# Patient Record
Sex: FEMALE | Race: WHITE | Employment: OTHER | URBAN - METROPOLITAN AREA
[De-identification: names, ages, dates, MRNs, and addresses within clinical notes are randomized per-mention and may not be internally consistent; named-entity substitution may affect disease eponyms.]

---

## 2018-10-02 ENCOUNTER — HOSPITAL ENCOUNTER (OUTPATIENT)
Age: 68
Discharge: HOME | End: 2018-10-04
Payer: MEDICARE

## 2018-10-02 PROCEDURE — 87088 URINE BACTERIA CULTURE: CPT

## 2018-10-02 PROCEDURE — 87186 SC STD MICRODIL/AGAR DIL: CPT

## 2018-10-02 PROCEDURE — 87077 CULTURE AEROBIC IDENTIFY: CPT

## 2018-10-05 LAB
ORGANISM: ABNORMAL
ORGANISM: ABNORMAL
URINE CULTURE, ROUTINE: ABNORMAL

## 2019-05-03 ENCOUNTER — APPOINTMENT (OUTPATIENT)
Dept: CT IMAGING | Age: 69
End: 2019-05-03
Payer: MEDICARE

## 2019-05-03 ENCOUNTER — HOSPITAL ENCOUNTER (EMERGENCY)
Age: 69
Discharge: HOME | End: 2019-05-03
Attending: EMERGENCY MEDICINE
Payer: MEDICARE

## 2019-05-03 VITALS
SYSTOLIC BLOOD PRESSURE: 142 MMHG | TEMPERATURE: 98.1 F | HEART RATE: 76 BPM | OXYGEN SATURATION: 96 % | RESPIRATION RATE: 14 BRPM | DIASTOLIC BLOOD PRESSURE: 85 MMHG
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
## 2019-05-03 DIAGNOSIS — S00.03XA HEMATOMA OF FRONTAL SCALP, INITIAL ENCOUNTER: ICD-10-CM

## 2019-05-03 DIAGNOSIS — S01.81XA FACIAL LACERATION, INITIAL ENCOUNTER: Primary | ICD-10-CM

## 2019-05-03 PROCEDURE — 70486 CT MAXILLOFACIAL W/O DYE: CPT

## 2019-05-03 PROCEDURE — 70450 CT HEAD/BRAIN W/O DYE: CPT

## 2019-05-03 PROCEDURE — 2500000003 HC RX 250 WO HCPCS: Performed by: STUDENT IN AN ORGANIZED HEALTH CARE EDUCATION/TRAINING PROGRAM

## 2019-05-03 PROCEDURE — 99283 EMERGENCY DEPT VISIT LOW MDM: CPT

## 2019-05-03 PROCEDURE — 12011 RPR F/E/E/N/L/M 2.5 CM/<: CPT

## 2019-05-03 PROCEDURE — 90715 TDAP VACCINE 7 YRS/> IM: CPT | Performed by: EMERGENCY MEDICINE

## 2019-05-03 PROCEDURE — 90471 IMMUNIZATION ADMIN: CPT | Performed by: EMERGENCY MEDICINE

## 2019-05-03 PROCEDURE — 6360000002 HC RX W HCPCS: Performed by: EMERGENCY MEDICINE

## 2019-05-03 RX ORDER — LIDOCAINE HYDROCHLORIDE AND EPINEPHRINE 10; 10 MG/ML; UG/ML
20 INJECTION, SOLUTION INFILTRATION; PERINEURAL ONCE
Status: COMPLETED | OUTPATIENT
Start: 2019-05-03 | End: 2019-05-03

## 2019-05-03 RX ADMIN — LIDOCAINE HYDROCHLORIDE AND EPINEPHRINE 20 ML: 10; 10 INJECTION, SOLUTION INFILTRATION; PERINEURAL at 12:03

## 2019-05-03 RX ADMIN — TETANUS TOXOID, REDUCED DIPHTHERIA TOXOID AND ACELLULAR PERTUSSIS VACCINE, ADSORBED 0.5 ML: 5; 2.5; 8; 8; 2.5 SUSPENSION INTRAMUSCULAR at 12:03

## 2019-05-03 ASSESSMENT — ENCOUNTER SYMPTOMS
ABDOMINAL DISTENTION: 0
TROUBLE SWALLOWING: 0
ABDOMINAL PAIN: 0
SHORTNESS OF BREATH: 0
VOMITING: 0
CONSTIPATION: 0
BLOOD IN STOOL: 0
PHOTOPHOBIA: 0
FACIAL SWELLING: 1
BACK PAIN: 0
WHEEZING: 0
CHEST TIGHTNESS: 0
SINUS PRESSURE: 0
EYE REDNESS: 0
SORE THROAT: 0
EYE PAIN: 0
DIARRHEA: 0
RHINORRHEA: 0
NAUSEA: 0
COUGH: 0

## 2019-05-03 ASSESSMENT — PAIN SCALES - GENERAL: PAINLEVEL_OUTOF10: 0

## 2019-05-03 NOTE — ED PROVIDER NOTES
Patient is a 59-year-old female who presented to ED for evaluation of fall and head laceration. Onset of symptoms was earlier this a.m. Patient notes that she had stepped off of her driveway and fell, landing onto sidewalk. Patient with laceration to the bridge of the nose as well as hematoma to the forehead. Patient denies loss of consciousness. Patient denies being on anticoagulation. Patient denies neck pain. Review of Systems   Constitutional: Negative for chills, diaphoresis, fatigue and fever. HENT: Positive for facial swelling. Negative for congestion, ear pain, rhinorrhea, sinus pressure, sneezing, sore throat and trouble swallowing. Fall, bleed/swelling to nasal bridge   Eyes: Negative for photophobia, pain and redness. Respiratory: Negative for cough, chest tightness, shortness of breath and wheezing. Cardiovascular: Negative for chest pain and palpitations. Gastrointestinal: Negative for abdominal distention, abdominal pain, blood in stool, constipation, diarrhea, nausea and vomiting. Endocrine: Negative for polydipsia and polyuria. Genitourinary: Negative for difficulty urinating, dysuria, flank pain, hematuria and urgency. Musculoskeletal: Negative for arthralgias, back pain, myalgias and neck pain. Skin: Negative for rash and wound. Neurological: Negative for weakness, light-headedness, numbness and headaches. Psychiatric/Behavioral: Negative for agitation and confusion. The patient is not nervous/anxious and is not hyperactive. Physical Exam   Constitutional: She is oriented to person, place, and time. She appears well-developed and well-nourished. No distress. HENT:   Head: Normocephalic. Right Ear: External ear normal.   Left Ear: External ear normal.   Nose: Nose lacerations and nasal deformity present. No septal deviation or nasal septal hematoma. No foreign bodies. Mouth/Throat: Oropharynx is clear and moist. No oropharyngeal exudate. Stellate 1.5 cm laceration to the bridge of the nose, 5 cm hematoma to the forehead. No septal hematoma noted on exam.   Eyes: Pupils are equal, round, and reactive to light. Conjunctivae and EOM are normal. Right eye exhibits no discharge. Left eye exhibits no discharge. Neck: Normal range of motion. Neck supple. No thyromegaly present. No cervical spinous process step-off, deformity, or tenderness to palpation. There are no signs of trauma to the neck. Cardiovascular: Normal rate, regular rhythm and normal heart sounds. No murmur heard. Pulmonary/Chest: Effort normal and breath sounds normal. No respiratory distress. She has no wheezes. She has no rales. She exhibits no tenderness. Abdominal: Soft. She exhibits no distension and no mass. There is no tenderness. There is no rebound and no guarding. Musculoskeletal: Normal range of motion. She exhibits no tenderness. Neurological: She is alert and oriented to person, place, and time. Skin: Skin is warm and dry. No rash noted. She is not diaphoretic. Psychiatric: She has a normal mood and affect. Her behavior is normal. Judgment and thought content normal.       Procedures  Patient Name: Ryann Lester   Medical Record Number: [id number]  Date: 5/3/2019   Time: 8:01 PM   Room/Bed: 07/07  Laceration Repair Procedure Note  Indication: Laceration    Procedure: The patient was placed in the appropriate position and anesthesia around the laceration was obtained by infiltration using 1% Lidocaine without epinephrine. The area was then cleansed using sterile saline. The laceration was closed with 6-0 Ethilon using interrupted sutures. There were no additional lacerations requiring repair. The wound area was then dressed with a sterile dressing. Total repaired wound length: 1.5 cm. Other Items: None    The patient tolerated the procedure well.     Complications: None      MDM  Number of Diagnoses or Management Options  Facial laceration, initial encounter:   Fall, initial encounter:   Hematoma of frontal scalp, initial encounter:   Diagnosis management comments: Patient is a 70-year-old female who presented to ED for evaluation of fall, laceration to nose, hematoma to forehead. On arrival to ED, physical exam significant for 1 cm laceration to the bridge of the nose with approximate 5 cm hematoma forehead. Active bleeding that is nonpulsatile noted to the laceration. Wound was repaired in the ED, see procedure note. CT of the head and facial bones with no acute intracranial abnormality or acute osseous changes. Decision was made to discharge the patient to home with instruction to follow up with primary care by calling their office in the morning. On repeat exam prior to discharge, patient resting comfortably in bed and in no apparent distress with no new complaints prior to discharge. All questions were answered prior to discharge and patient is agreeable to plan.         --------------------------------------------- PAST HISTORY ---------------------------------------------  Past Medical History:  has a past medical history of Arthritis, Cataract of left eye, Diabetes mellitus ([address] Hyperlipidemia, Hypertension, Lip lesion, Prolonged emergence from general anesthesia, Restless leg syndrome, and Sleep apnea. Past Surgical History:  has a past surgical history that includes Rotator cuff repair; hernia repair; Cystocopy (03/02/2012); Carpal tunnel release; fracture surgery; Cystocopy (03/14/2012); Kidney stone surgery; Hysterectomy; lipoma resection; Colonoscopy; Cystocopy (4/22/2014); other surgical history (N/A, 10/24/14); Hammer toe surgery (Left); Cataract removal with implant (Left, 02/07/2017); and Cataract removal with implant (Right, 04/06/2017). Social History:  reports that she has never smoked. She has never used smokeless tobacco. She reports that she does not drink alcohol or use drugs.     Family History: family history includes Heart Attack (age of onset: 48) in her father. The patients home medications have been reviewed. Allergies: Dye [iodides]; Levaquin [levofloxacin]; Penicillins; and Sulfa antibiotics    -------------------------------------------------- RESULTS -------------------------------------------------  Labs:  No results found for this visit on 05/03/19. Radiology:  CT Facial Bones WO Contrast   Final Result   Scalp hematoma/soft tissue swelling and deformity. There is no acute   fractures. CT Head WO Contrast   Final Result   No evidence of intracranial hemorrhage or edema. There is   age-appropriate atrophy. There is scalp hematoma in the frontal area. ------------------------- NURSING NOTES AND VITALS REVIEWED ---------------------------  Date / Time Roomed:  5/3/2019 10:37 AM  ED Bed Assignment:  07/07    The nursing notes within the ED encounter and vital signs as below have been reviewed. BP (!) 142/85   Pulse 76   Temp 98.1 °F (36.7 °C)   Resp 14   SpO2 96%   Oxygen Saturation Interpretation: Normal      ------------------------------------------ PROGRESS NOTES ------------------------------------------  10:52 AM  I have spoken with the patient and discussed todays results, in addition to providing specific details for the plan of care and counseling regarding the diagnosis and prognosis. Their questions are answered at this time and they are agreeable with the plan. I discussed at length with them reasons for immediate return here for re evaluation. They will followup with their primary care physician by calling their office tomorrow. --------------------------------- ADDITIONAL PROVIDER NOTES ---------------------------------  At this time the patient is without objective evidence of an acute process requiring hospitalization or inpatient management.   They have remained hemodynamically stable throughout their entire ED visit and are stable for discharge with

## 2019-09-06 ENCOUNTER — HOSPITAL ENCOUNTER (OUTPATIENT)
Age: 69
Discharge: HOME | End: 2019-09-08

## 2019-09-06 PROCEDURE — 88300 SURGICAL PATH GROSS: CPT

## 2019-12-13 ENCOUNTER — HOSPITAL ENCOUNTER (INPATIENT)
Age: 69
LOS: 4 days | Discharge: HOME | DRG: 247 | End: 2019-12-17
Attending: INTERNAL MEDICINE | Admitting: INTERNAL MEDICINE
Payer: MEDICARE

## 2019-12-13 ENCOUNTER — APPOINTMENT (OUTPATIENT)
Dept: GENERAL RADIOLOGY | Age: 69
End: 2019-12-13
Payer: MEDICARE

## 2019-12-13 ENCOUNTER — HOSPITAL ENCOUNTER (EMERGENCY)
Age: 69
Discharge: ANOTHER HOSPITAL | End: 2019-12-13
Attending: EMERGENCY MEDICINE
Payer: MEDICARE

## 2019-12-13 ENCOUNTER — HOSPITAL ENCOUNTER (OUTPATIENT)
Age: 69
Discharge: HOME | End: 2019-12-13
Payer: MEDICARE

## 2019-12-13 ENCOUNTER — TELEPHONE (OUTPATIENT)
Dept: CARDIOLOGY CLINIC | Age: 69
End: 2019-12-13

## 2019-12-13 VITALS
DIASTOLIC BLOOD PRESSURE: 84 MMHG | SYSTOLIC BLOOD PRESSURE: 171 MMHG | WEIGHT: 250 LBS | HEIGHT: 66 IN | HEART RATE: 71 BPM | RESPIRATION RATE: 16 BRPM | BODY MASS INDEX: 40.18 KG/M2 | TEMPERATURE: 97.6 F | OXYGEN SATURATION: 98 %

## 2019-12-13 DIAGNOSIS — R07.9 CHEST PAIN, UNSPECIFIED TYPE: ICD-10-CM

## 2019-12-13 DIAGNOSIS — I20.0 UNSTABLE ANGINA (HCC): Primary | ICD-10-CM

## 2019-12-13 PROBLEM — R06.02 SHORTNESS OF BREATH: Status: ACTIVE | Noted: 2019-12-13

## 2019-12-13 PROBLEM — I21.4 NSTEMI (NON-ST ELEVATION MYOCARDIAL INFARCTION) (HCC): Status: ACTIVE | Noted: 2019-12-13

## 2019-12-13 PROBLEM — I21.4 NSTEMI (NON-ST ELEVATED MYOCARDIAL INFARCTION) (HCC): Status: ACTIVE | Noted: 2019-12-13

## 2019-12-13 LAB
ANION GAP SERPL CALCULATED.3IONS-SCNC: 14 MMOL/L (ref 7–16)
APTT: 28.9 SEC (ref 24.5–35.1)
APTT: 30 SEC (ref 24.5–35.1)
BASOPHILS ABSOLUTE: 0.04 E9/L (ref 0–0.2)
BASOPHILS RELATIVE PERCENT: 0.7 % (ref 0–2)
BUN BLDV-MCNC: 16 MG/DL (ref 8–23)
CALCIUM SERPL-MCNC: 9.1 MG/DL (ref 8.6–10.2)
CHLORIDE BLD-SCNC: 104 MMOL/L (ref 98–107)
CO2: 22 MMOL/L (ref 22–29)
CREAT SERPL-MCNC: 1.1 MG/DL (ref 0.5–1)
EKG ATRIAL RATE: 72 BPM
EKG P AXIS: 73 DEGREES
EKG P-R INTERVAL: 214 MS
EKG Q-T INTERVAL: 470 MS
EKG QRS DURATION: 116 MS
EKG QTC CALCULATION (BAZETT): 514 MS
EKG R AXIS: -75 DEGREES
EKG T AXIS: 127 DEGREES
EKG VENTRICULAR RATE: 72 BPM
EOSINOPHILS ABSOLUTE: 0.15 E9/L (ref 0.05–0.5)
EOSINOPHILS RELATIVE PERCENT: 2.6 % (ref 0–6)
GFR AFRICAN AMERICAN: 59
GFR NON-AFRICAN AMERICAN: 49 ML/MIN/1.73
GLUCOSE BLD-MCNC: 155 MG/DL (ref 74–99)
HCT VFR BLD CALC: 39.8 % (ref 34–48)
HEMOGLOBIN: 12.1 G/DL (ref 11.5–15.5)
IMMATURE GRANULOCYTES #: 0.02 E9/L
IMMATURE GRANULOCYTES %: 0.3 % (ref 0–5)
INR BLD: 1
INR BLD: 1.1
LYMPHOCYTES ABSOLUTE: 1.21 E9/L (ref 1.5–4)
LYMPHOCYTES RELATIVE PERCENT: 20.9 % (ref 20–42)
MCH RBC QN AUTO: 27.3 PG (ref 26–35)
MCHC RBC AUTO-ENTMCNC: 30.4 % (ref 32–34.5)
MCV RBC AUTO: 89.8 FL (ref 80–99.9)
METER GLUCOSE: 112 MG/DL (ref 74–99)
METER GLUCOSE: 145 MG/DL (ref 74–99)
MONOCYTES ABSOLUTE: 0.44 E9/L (ref 0.1–0.95)
MONOCYTES RELATIVE PERCENT: 7.6 % (ref 2–12)
NEUTROPHILS ABSOLUTE: 3.93 E9/L (ref 1.8–7.3)
NEUTROPHILS RELATIVE PERCENT: 67.9 % (ref 43–80)
PDW BLD-RTO: 15.9 FL (ref 11.5–15)
PLATELET # BLD: 224 E9/L (ref 130–450)
PMV BLD AUTO: 10.6 FL (ref 7–12)
POTASSIUM REFLEX MAGNESIUM: 4.6 MMOL/L (ref 3.5–5)
PROTHROMBIN TIME: 11 SEC (ref 9.3–12.4)
PROTHROMBIN TIME: 12.1 SEC (ref 9.3–12.4)
RBC # BLD: 4.43 E12/L (ref 3.5–5.5)
SODIUM BLD-SCNC: 140 MMOL/L (ref 132–146)
TROPONIN: <0.01 NG/ML (ref 0–0.03)
TROPONIN: <0.01 NG/ML (ref 0–0.03)
TSH SERPL DL<=0.05 MIU/L-ACNC: 1.67 UIU/ML (ref 0.27–4.2)
WBC # BLD: 5.8 E9/L (ref 4.5–11.5)

## 2019-12-13 PROCEDURE — 2140000000 HC CCU INTERMEDIATE R&B

## 2019-12-13 PROCEDURE — A0426 ALS 1: HCPCS

## 2019-12-13 PROCEDURE — 6370000000 HC RX 637 (ALT 250 FOR IP): Performed by: STUDENT IN AN ORGANIZED HEALTH CARE EDUCATION/TRAINING PROGRAM

## 2019-12-13 PROCEDURE — 6360000002 HC RX W HCPCS: Performed by: PHYSICIAN ASSISTANT

## 2019-12-13 PROCEDURE — 85025 COMPLETE CBC W/AUTO DIFF WBC: CPT

## 2019-12-13 PROCEDURE — 6370000000 HC RX 637 (ALT 250 FOR IP): Performed by: PHYSICIAN ASSISTANT

## 2019-12-13 PROCEDURE — A0425 GROUND MILEAGE: HCPCS

## 2019-12-13 PROCEDURE — 85610 PROTHROMBIN TIME: CPT

## 2019-12-13 PROCEDURE — 85730 THROMBOPLASTIN TIME PARTIAL: CPT

## 2019-12-13 PROCEDURE — 84443 ASSAY THYROID STIM HORMONE: CPT

## 2019-12-13 PROCEDURE — 84484 ASSAY OF TROPONIN QUANT: CPT

## 2019-12-13 PROCEDURE — 82962 GLUCOSE BLOOD TEST: CPT

## 2019-12-13 PROCEDURE — 2580000003 HC RX 258: Performed by: PHYSICIAN ASSISTANT

## 2019-12-13 PROCEDURE — 99291 CRITICAL CARE FIRST HOUR: CPT

## 2019-12-13 PROCEDURE — 71045 X-RAY EXAM CHEST 1 VIEW: CPT

## 2019-12-13 PROCEDURE — 6360000002 HC RX W HCPCS: Performed by: EMERGENCY MEDICINE

## 2019-12-13 PROCEDURE — 93005 ELECTROCARDIOGRAM TRACING: CPT | Performed by: EMERGENCY MEDICINE

## 2019-12-13 PROCEDURE — 96374 THER/PROPH/DIAG INJ IV PUSH: CPT

## 2019-12-13 PROCEDURE — 80048 BASIC METABOLIC PNL TOTAL CA: CPT

## 2019-12-13 PROCEDURE — 94664 DEMO&/EVAL PT USE INHALER: CPT

## 2019-12-13 PROCEDURE — 36415 COLL VENOUS BLD VENIPUNCTURE: CPT

## 2019-12-13 RX ORDER — LOSARTAN POTASSIUM 50 MG/1
50 TABLET ORAL DAILY
Status: DISCONTINUED | OUTPATIENT
Start: 2019-12-13 | End: 2019-12-17 | Stop reason: HOSPADM

## 2019-12-13 RX ORDER — LEVOTHYROXINE SODIUM 137 UG/1
137 CAPSULE ORAL DAILY
COMMUNITY

## 2019-12-13 RX ORDER — CEPHALEXIN 250 MG/1
250 CAPSULE ORAL
Status: CANCELLED | OUTPATIENT
Start: 2019-12-13

## 2019-12-13 RX ORDER — NITROGLYCERIN 0.4 MG/1
0.4 TABLET SUBLINGUAL EVERY 5 MIN PRN
Status: CANCELLED | OUTPATIENT
Start: 2019-12-13

## 2019-12-13 RX ORDER — HEPARIN SODIUM 10000 [USP'U]/100ML
8.81 INJECTION, SOLUTION INTRAVENOUS CONTINUOUS
Status: DISCONTINUED | OUTPATIENT
Start: 2019-12-13 | End: 2019-12-13 | Stop reason: HOSPADM

## 2019-12-13 RX ORDER — POLYETHYLENE GLYCOL 3350 17 G/17G
17 POWDER, FOR SOLUTION ORAL DAILY PRN
Status: CANCELLED | OUTPATIENT
Start: 2019-12-13

## 2019-12-13 RX ORDER — ROPINIROLE 1 MG/1
1 TABLET, FILM COATED ORAL 2 TIMES DAILY
Status: DISCONTINUED | OUTPATIENT
Start: 2019-12-13 | End: 2019-12-17 | Stop reason: HOSPADM

## 2019-12-13 RX ORDER — ESTRADIOL 10 UG/1
10 INSERT VAGINAL
Status: DISCONTINUED | OUTPATIENT
Start: 2019-12-16 | End: 2019-12-17 | Stop reason: HOSPADM

## 2019-12-13 RX ORDER — DEXTROSE MONOHYDRATE 25 G/50ML
12.5 INJECTION, SOLUTION INTRAVENOUS PRN
Status: DISCONTINUED | OUTPATIENT
Start: 2019-12-13 | End: 2019-12-17 | Stop reason: HOSPADM

## 2019-12-13 RX ORDER — OXYBUTYNIN CHLORIDE 5 MG/1
10 TABLET ORAL 2 TIMES DAILY
Status: CANCELLED | OUTPATIENT
Start: 2019-12-13

## 2019-12-13 RX ORDER — SODIUM CHLORIDE 0.9 % (FLUSH) 0.9 %
10 SYRINGE (ML) INJECTION EVERY 12 HOURS SCHEDULED
Status: CANCELLED | OUTPATIENT
Start: 2019-12-13

## 2019-12-13 RX ORDER — INSULIN GLARGINE 100 [IU]/ML
22 INJECTION, SOLUTION SUBCUTANEOUS 2 TIMES DAILY
Status: DISCONTINUED | OUTPATIENT
Start: 2019-12-13 | End: 2019-12-17 | Stop reason: HOSPADM

## 2019-12-13 RX ORDER — ROPINIROLE 1 MG/1
1 TABLET, FILM COATED ORAL 2 TIMES DAILY
Status: CANCELLED | OUTPATIENT
Start: 2019-12-13

## 2019-12-13 RX ORDER — SODIUM CHLORIDE 0.9 % (FLUSH) 0.9 %
10 SYRINGE (ML) INJECTION PRN
Status: DISCONTINUED | OUTPATIENT
Start: 2019-12-13 | End: 2019-12-16 | Stop reason: SDUPTHER

## 2019-12-13 RX ORDER — IPRATROPIUM BROMIDE AND ALBUTEROL SULFATE 2.5; .5 MG/3ML; MG/3ML
1 SOLUTION RESPIRATORY (INHALATION)
Status: CANCELLED | OUTPATIENT
Start: 2019-12-13

## 2019-12-13 RX ORDER — IPRATROPIUM BROMIDE AND ALBUTEROL SULFATE 2.5; .5 MG/3ML; MG/3ML
1 SOLUTION RESPIRATORY (INHALATION)
Status: DISCONTINUED | OUTPATIENT
Start: 2019-12-13 | End: 2019-12-17 | Stop reason: HOSPADM

## 2019-12-13 RX ORDER — DEXTROSE MONOHYDRATE 50 MG/ML
100 INJECTION, SOLUTION INTRAVENOUS PRN
Status: DISCONTINUED | OUTPATIENT
Start: 2019-12-13 | End: 2019-12-17 | Stop reason: HOSPADM

## 2019-12-13 RX ORDER — ESTRADIOL 10 UG/1
10 INSERT VAGINAL
Status: CANCELLED | OUTPATIENT
Start: 2019-12-13

## 2019-12-13 RX ORDER — ASPIRIN 325 MG
325 TABLET ORAL DAILY
Status: DISCONTINUED | OUTPATIENT
Start: 2019-12-13 | End: 2019-12-16

## 2019-12-13 RX ORDER — POTASSIUM CHLORIDE 7.45 MG/ML
10 INJECTION INTRAVENOUS PRN
Status: CANCELLED | OUTPATIENT
Start: 2019-12-13

## 2019-12-13 RX ORDER — SODIUM CHLORIDE 9 MG/ML
INJECTION, SOLUTION INTRAVENOUS CONTINUOUS
Status: CANCELLED | OUTPATIENT
Start: 2019-12-13

## 2019-12-13 RX ORDER — HEPARIN SODIUM 1000 [USP'U]/ML
2000 INJECTION, SOLUTION INTRAVENOUS; SUBCUTANEOUS PRN
Status: DISCONTINUED | OUTPATIENT
Start: 2019-12-13 | End: 2019-12-13 | Stop reason: HOSPADM

## 2019-12-13 RX ORDER — ACETAMINOPHEN 325 MG/1
650 TABLET ORAL EVERY 4 HOURS PRN
Status: CANCELLED | OUTPATIENT
Start: 2019-12-13

## 2019-12-13 RX ORDER — SODIUM CHLORIDE 9 MG/ML
INJECTION, SOLUTION INTRAVENOUS CONTINUOUS
Status: DISCONTINUED | OUTPATIENT
Start: 2019-12-13 | End: 2019-12-14

## 2019-12-13 RX ORDER — ASPIRIN 325 MG
325 TABLET ORAL DAILY
Status: CANCELLED | OUTPATIENT
Start: 2019-12-13

## 2019-12-13 RX ORDER — ASPIRIN 81 MG/1
324 TABLET, CHEWABLE ORAL ONCE
Status: COMPLETED | OUTPATIENT
Start: 2019-12-13 | End: 2019-12-13

## 2019-12-13 RX ORDER — CEPHALEXIN 250 MG/1
250 CAPSULE ORAL
Status: DISCONTINUED | OUTPATIENT
Start: 2019-12-16 | End: 2019-12-17 | Stop reason: HOSPADM

## 2019-12-13 RX ORDER — NITROGLYCERIN 0.4 MG/1
0.4 TABLET SUBLINGUAL EVERY 5 MIN PRN
Status: DISCONTINUED | OUTPATIENT
Start: 2019-12-13 | End: 2019-12-17 | Stop reason: HOSPADM

## 2019-12-13 RX ORDER — ATORVASTATIN CALCIUM 40 MG/1
40 TABLET, FILM COATED ORAL DAILY
Status: CANCELLED | OUTPATIENT
Start: 2019-12-13

## 2019-12-13 RX ORDER — NICOTINE POLACRILEX 4 MG
15 LOZENGE BUCCAL PRN
Status: CANCELLED | OUTPATIENT
Start: 2019-12-13

## 2019-12-13 RX ORDER — LEVOTHYROXINE SODIUM 0.15 MG/1
150 TABLET ORAL DAILY
Status: CANCELLED | OUTPATIENT
Start: 2019-12-13

## 2019-12-13 RX ORDER — LOSARTAN POTASSIUM 50 MG/1
50 TABLET ORAL DAILY
Status: CANCELLED | OUTPATIENT
Start: 2019-12-13

## 2019-12-13 RX ORDER — NICOTINE POLACRILEX 4 MG
15 LOZENGE BUCCAL PRN
Status: DISCONTINUED | OUTPATIENT
Start: 2019-12-13 | End: 2019-12-17 | Stop reason: HOSPADM

## 2019-12-13 RX ORDER — ATORVASTATIN CALCIUM 40 MG/1
40 TABLET, FILM COATED ORAL DAILY
Status: DISCONTINUED | OUTPATIENT
Start: 2019-12-13 | End: 2019-12-17 | Stop reason: HOSPADM

## 2019-12-13 RX ORDER — DEXTROSE MONOHYDRATE 50 MG/ML
100 INJECTION, SOLUTION INTRAVENOUS PRN
Status: CANCELLED | OUTPATIENT
Start: 2019-12-13

## 2019-12-13 RX ORDER — SODIUM CHLORIDE 0.9 % (FLUSH) 0.9 %
10 SYRINGE (ML) INJECTION EVERY 12 HOURS SCHEDULED
Status: DISCONTINUED | OUTPATIENT
Start: 2019-12-13 | End: 2019-12-17 | Stop reason: HOSPADM

## 2019-12-13 RX ORDER — HEPARIN SODIUM 1000 [USP'U]/ML
4000 INJECTION, SOLUTION INTRAVENOUS; SUBCUTANEOUS PRN
Status: DISCONTINUED | OUTPATIENT
Start: 2019-12-13 | End: 2019-12-16 | Stop reason: ALTCHOICE

## 2019-12-13 RX ORDER — HEPARIN SODIUM 1000 [USP'U]/ML
2000 INJECTION, SOLUTION INTRAVENOUS; SUBCUTANEOUS PRN
Status: DISCONTINUED | OUTPATIENT
Start: 2019-12-13 | End: 2019-12-16 | Stop reason: ALTCHOICE

## 2019-12-13 RX ORDER — OXYBUTYNIN CHLORIDE 5 MG/1
10 TABLET ORAL 2 TIMES DAILY
Status: DISCONTINUED | OUTPATIENT
Start: 2019-12-13 | End: 2019-12-17 | Stop reason: HOSPADM

## 2019-12-13 RX ORDER — POTASSIUM CHLORIDE 20 MEQ/1
40 TABLET, EXTENDED RELEASE ORAL PRN
Status: DISCONTINUED | OUTPATIENT
Start: 2019-12-13 | End: 2019-12-17 | Stop reason: HOSPADM

## 2019-12-13 RX ORDER — ONDANSETRON 2 MG/ML
4 INJECTION INTRAMUSCULAR; INTRAVENOUS EVERY 6 HOURS PRN
Status: DISCONTINUED | OUTPATIENT
Start: 2019-12-13 | End: 2019-12-17 | Stop reason: HOSPADM

## 2019-12-13 RX ORDER — POTASSIUM CHLORIDE 7.45 MG/ML
10 INJECTION INTRAVENOUS PRN
Status: DISCONTINUED | OUTPATIENT
Start: 2019-12-13 | End: 2019-12-17 | Stop reason: HOSPADM

## 2019-12-13 RX ORDER — POLYETHYLENE GLYCOL 3350 17 G/17G
17 POWDER, FOR SOLUTION ORAL DAILY PRN
Status: DISCONTINUED | OUTPATIENT
Start: 2019-12-13 | End: 2019-12-17 | Stop reason: HOSPADM

## 2019-12-13 RX ORDER — FLUTICASONE PROPIONATE 50 MCG
2 SPRAY, SUSPENSION (ML) NASAL DAILY
Status: DISCONTINUED | OUTPATIENT
Start: 2019-12-13 | End: 2019-12-17 | Stop reason: HOSPADM

## 2019-12-13 RX ORDER — CEPHALEXIN 250 MG/1
250 CAPSULE ORAL
COMMUNITY

## 2019-12-13 RX ORDER — LEVOTHYROXINE SODIUM 0.15 MG/1
150 TABLET ORAL DAILY
Status: DISCONTINUED | OUTPATIENT
Start: 2019-12-13 | End: 2019-12-17 | Stop reason: HOSPADM

## 2019-12-13 RX ORDER — SODIUM CHLORIDE 0.9 % (FLUSH) 0.9 %
10 SYRINGE (ML) INJECTION PRN
Status: CANCELLED | OUTPATIENT
Start: 2019-12-13

## 2019-12-13 RX ORDER — POTASSIUM CHLORIDE 20 MEQ/1
40 TABLET, EXTENDED RELEASE ORAL PRN
Status: CANCELLED | OUTPATIENT
Start: 2019-12-13

## 2019-12-13 RX ORDER — ONDANSETRON 2 MG/ML
4 INJECTION INTRAMUSCULAR; INTRAVENOUS EVERY 6 HOURS PRN
Status: CANCELLED | OUTPATIENT
Start: 2019-12-13

## 2019-12-13 RX ORDER — DEXTROSE MONOHYDRATE 25 G/50ML
12.5 INJECTION, SOLUTION INTRAVENOUS PRN
Status: CANCELLED | OUTPATIENT
Start: 2019-12-13

## 2019-12-13 RX ORDER — HEPARIN SODIUM 1000 [USP'U]/ML
4000 INJECTION, SOLUTION INTRAVENOUS; SUBCUTANEOUS PRN
Status: DISCONTINUED | OUTPATIENT
Start: 2019-12-13 | End: 2019-12-13 | Stop reason: HOSPADM

## 2019-12-13 RX ORDER — ESTRADIOL 10 UG/1
10 INSERT VAGINAL
COMMUNITY

## 2019-12-13 RX ORDER — HEPARIN SODIUM 1000 [USP'U]/ML
4000 INJECTION, SOLUTION INTRAVENOUS; SUBCUTANEOUS ONCE
Status: COMPLETED | OUTPATIENT
Start: 2019-12-13 | End: 2019-12-13

## 2019-12-13 RX ORDER — FLUTICASONE PROPIONATE 50 MCG
2 SPRAY, SUSPENSION (ML) NASAL DAILY
Status: CANCELLED | OUTPATIENT
Start: 2019-12-13

## 2019-12-13 RX ORDER — ACETAMINOPHEN 325 MG/1
650 TABLET ORAL EVERY 4 HOURS PRN
Status: DISCONTINUED | OUTPATIENT
Start: 2019-12-13 | End: 2019-12-16 | Stop reason: SDUPTHER

## 2019-12-13 RX ORDER — HEPARIN SODIUM 10000 [USP'U]/100ML
8.81 INJECTION, SOLUTION INTRAVENOUS CONTINUOUS
Status: DISCONTINUED | OUTPATIENT
Start: 2019-12-13 | End: 2019-12-14

## 2019-12-13 RX ADMIN — ROPINIROLE HYDROCHLORIDE 1 MG: 1 TABLET, FILM COATED ORAL at 20:54

## 2019-12-13 RX ADMIN — ASPIRIN 81 MG 324 MG: 81 TABLET ORAL at 12:19

## 2019-12-13 RX ADMIN — INSULIN GLARGINE 22 UNITS: 100 INJECTION, SOLUTION SUBCUTANEOUS at 21:05

## 2019-12-13 RX ADMIN — OXYBUTYNIN CHLORIDE 10 MG: 5 TABLET ORAL at 20:54

## 2019-12-13 RX ADMIN — NITROGLYCERIN 0.5 INCH: 20 OINTMENT TOPICAL at 12:19

## 2019-12-13 RX ADMIN — METFORMIN HYDROCHLORIDE 1000 MG: 1000 TABLET ORAL at 18:56

## 2019-12-13 RX ADMIN — HEPARIN SODIUM 8.81 UNITS/KG/HR: 10000 INJECTION, SOLUTION INTRAVENOUS at 13:47

## 2019-12-13 RX ADMIN — SODIUM CHLORIDE: 9 INJECTION, SOLUTION INTRAVENOUS at 18:59

## 2019-12-13 RX ADMIN — HEPARIN SODIUM 10 ML/HR: 10000 INJECTION, SOLUTION INTRAVENOUS at 19:08

## 2019-12-13 RX ADMIN — LOSARTAN POTASSIUM 50 MG: 50 TABLET, FILM COATED ORAL at 18:56

## 2019-12-13 RX ADMIN — ATORVASTATIN CALCIUM 40 MG: 40 TABLET, FILM COATED ORAL at 20:54

## 2019-12-13 RX ADMIN — HEPARIN SODIUM 4000 UNITS: 1000 INJECTION, SOLUTION INTRAVENOUS; SUBCUTANEOUS at 13:46

## 2019-12-13 RX ADMIN — INSULIN LISPRO 1 UNITS: 100 INJECTION, SOLUTION INTRAVENOUS; SUBCUTANEOUS at 21:06

## 2019-12-13 RX ADMIN — HEPARIN SODIUM 2000 UNITS: 1000 INJECTION, SOLUTION INTRAVENOUS; SUBCUTANEOUS at 23:38

## 2019-12-13 RX ADMIN — IPRATROPIUM BROMIDE AND ALBUTEROL SULFATE 1 AMPULE: 2.5; .5 SOLUTION RESPIRATORY (INHALATION) at 20:34

## 2019-12-13 ASSESSMENT — PAIN SCALES - GENERAL
PAINLEVEL_OUTOF10: 0
PAINLEVEL_OUTOF10: 0

## 2019-12-13 ASSESSMENT — ENCOUNTER SYMPTOMS
COUGH: 0
SORE THROAT: 0
CHEST TIGHTNESS: 1
DIARRHEA: 0
ABDOMINAL PAIN: 0
SHORTNESS OF BREATH: 1
VOMITING: 0
RHINORRHEA: 0
NAUSEA: 0
BACK PAIN: 0

## 2019-12-14 LAB
ALBUMIN SERPL-MCNC: 3.7 G/DL (ref 3.5–5.2)
ALP BLD-CCNC: 62 U/L (ref 35–104)
ALT SERPL-CCNC: 17 U/L (ref 0–32)
ANION GAP SERPL CALCULATED.3IONS-SCNC: 15 MMOL/L (ref 7–16)
APTT: 85.1 SEC (ref 24.5–35.1)
AST SERPL-CCNC: 27 U/L (ref 0–31)
BILIRUB SERPL-MCNC: 0.5 MG/DL (ref 0–1.2)
BILIRUBIN URINE: NEGATIVE
BLOOD, URINE: NEGATIVE
BUN BLDV-MCNC: 21 MG/DL (ref 8–23)
CALCIUM SERPL-MCNC: 9.1 MG/DL (ref 8.6–10.2)
CHLORIDE BLD-SCNC: 108 MMOL/L (ref 98–107)
CHOLESTEROL, TOTAL: 130 MG/DL (ref 0–199)
CLARITY: CLEAR
CO2: 19 MMOL/L (ref 22–29)
COLOR: YELLOW
CREAT SERPL-MCNC: 1.2 MG/DL (ref 0.5–1)
GFR AFRICAN AMERICAN: 54
GFR NON-AFRICAN AMERICAN: 44 ML/MIN/1.73
GLUCOSE BLD-MCNC: 123 MG/DL (ref 74–99)
GLUCOSE URINE: NEGATIVE MG/DL
HCT VFR BLD CALC: 37.9 % (ref 34–48)
HDLC SERPL-MCNC: 71 MG/DL
HEMOGLOBIN: 11.4 G/DL (ref 11.5–15.5)
KETONES, URINE: NEGATIVE MG/DL
LDL CHOLESTEROL CALCULATED: 24 MG/DL (ref 0–99)
LEUKOCYTE ESTERASE, URINE: NEGATIVE
MCH RBC QN AUTO: 26.8 PG (ref 26–35)
MCHC RBC AUTO-ENTMCNC: 30.1 % (ref 32–34.5)
MCV RBC AUTO: 89 FL (ref 80–99.9)
METER GLUCOSE: 120 MG/DL (ref 74–99)
METER GLUCOSE: 129 MG/DL (ref 74–99)
METER GLUCOSE: 135 MG/DL (ref 74–99)
METER GLUCOSE: 154 MG/DL (ref 74–99)
NITRITE, URINE: NEGATIVE
PDW BLD-RTO: 15.9 FL (ref 11.5–15)
PH UA: 6.5 (ref 5–9)
PLATELET # BLD: 235 E9/L (ref 130–450)
PMV BLD AUTO: 11.2 FL (ref 7–12)
POTASSIUM REFLEX MAGNESIUM: 4.4 MMOL/L (ref 3.5–5)
PROTEIN UA: NEGATIVE MG/DL
RBC # BLD: 4.26 E12/L (ref 3.5–5.5)
SODIUM BLD-SCNC: 142 MMOL/L (ref 132–146)
SPECIFIC GRAVITY UA: 1.01 (ref 1–1.03)
TOTAL PROTEIN: 6.5 G/DL (ref 6.4–8.3)
TRIGL SERPL-MCNC: 174 MG/DL (ref 0–149)
TROPONIN: <0.01 NG/ML (ref 0–0.03)
UROBILINOGEN, URINE: 0.2 E.U./DL
VLDLC SERPL CALC-MCNC: 35 MG/DL
WBC # BLD: 6.8 E9/L (ref 4.5–11.5)

## 2019-12-14 PROCEDURE — 80061 LIPID PANEL: CPT

## 2019-12-14 PROCEDURE — 2140000000 HC CCU INTERMEDIATE R&B

## 2019-12-14 PROCEDURE — 81003 URINALYSIS AUTO W/O SCOPE: CPT

## 2019-12-14 PROCEDURE — 6370000000 HC RX 637 (ALT 250 FOR IP): Performed by: PHYSICIAN ASSISTANT

## 2019-12-14 PROCEDURE — 6370000000 HC RX 637 (ALT 250 FOR IP): Performed by: NURSE PRACTITIONER

## 2019-12-14 PROCEDURE — 85730 THROMBOPLASTIN TIME PARTIAL: CPT

## 2019-12-14 PROCEDURE — 82044 UR ALBUMIN SEMIQUANTITATIVE: CPT

## 2019-12-14 PROCEDURE — 2580000003 HC RX 258: Performed by: PHYSICIAN ASSISTANT

## 2019-12-14 PROCEDURE — 94640 AIRWAY INHALATION TREATMENT: CPT

## 2019-12-14 PROCEDURE — 99223 1ST HOSP IP/OBS HIGH 75: CPT | Performed by: INTERNAL MEDICINE

## 2019-12-14 PROCEDURE — 6360000002 HC RX W HCPCS: Performed by: NURSE PRACTITIONER

## 2019-12-14 PROCEDURE — 82962 GLUCOSE BLOOD TEST: CPT

## 2019-12-14 PROCEDURE — 36415 COLL VENOUS BLD VENIPUNCTURE: CPT

## 2019-12-14 PROCEDURE — APPSS45 APP SPLIT SHARED TIME 31-45 MINUTES: Performed by: NURSE PRACTITIONER

## 2019-12-14 PROCEDURE — 85027 COMPLETE CBC AUTOMATED: CPT

## 2019-12-14 PROCEDURE — 82570 ASSAY OF URINE CREATININE: CPT

## 2019-12-14 PROCEDURE — 84484 ASSAY OF TROPONIN QUANT: CPT

## 2019-12-14 PROCEDURE — 80053 COMPREHEN METABOLIC PANEL: CPT

## 2019-12-14 RX ORDER — METOPROLOL SUCCINATE 25 MG/1
25 TABLET, EXTENDED RELEASE ORAL DAILY
Status: DISCONTINUED | OUTPATIENT
Start: 2019-12-14 | End: 2019-12-17

## 2019-12-14 RX ADMIN — INSULIN GLARGINE 22 UNITS: 100 INJECTION, SOLUTION SUBCUTANEOUS at 21:16

## 2019-12-14 RX ADMIN — IPRATROPIUM BROMIDE AND ALBUTEROL SULFATE 1 AMPULE: 2.5; .5 SOLUTION RESPIRATORY (INHALATION) at 11:13

## 2019-12-14 RX ADMIN — METFORMIN HYDROCHLORIDE 1000 MG: 1000 TABLET ORAL at 16:32

## 2019-12-14 RX ADMIN — ROPINIROLE HYDROCHLORIDE 1 MG: 1 TABLET, FILM COATED ORAL at 08:16

## 2019-12-14 RX ADMIN — SODIUM CHLORIDE, PRESERVATIVE FREE 10 ML: 5 INJECTION INTRAVENOUS at 21:15

## 2019-12-14 RX ADMIN — ENOXAPARIN SODIUM 40 MG: 40 INJECTION SUBCUTANEOUS at 12:21

## 2019-12-14 RX ADMIN — POLYETHYLENE GLYCOL 3350 17 G: 17 POWDER, FOR SOLUTION ORAL at 21:16

## 2019-12-14 RX ADMIN — OXYBUTYNIN CHLORIDE 10 MG: 5 TABLET ORAL at 21:15

## 2019-12-14 RX ADMIN — ASPIRIN 325 MG: 325 TABLET, FILM COATED ORAL at 08:16

## 2019-12-14 RX ADMIN — INSULIN LISPRO 1 UNITS: 100 INJECTION, SOLUTION INTRAVENOUS; SUBCUTANEOUS at 12:18

## 2019-12-14 RX ADMIN — METOPROLOL SUCCINATE 25 MG: 25 TABLET, EXTENDED RELEASE ORAL at 12:18

## 2019-12-14 RX ADMIN — IPRATROPIUM BROMIDE AND ALBUTEROL SULFATE 1 AMPULE: 2.5; .5 SOLUTION RESPIRATORY (INHALATION) at 17:41

## 2019-12-14 RX ADMIN — LEVOTHYROXINE SODIUM 150 MCG: 150 TABLET ORAL at 06:16

## 2019-12-14 RX ADMIN — METFORMIN HYDROCHLORIDE 1000 MG: 1000 TABLET ORAL at 08:16

## 2019-12-14 RX ADMIN — OXYBUTYNIN CHLORIDE 10 MG: 5 TABLET ORAL at 08:16

## 2019-12-14 RX ADMIN — IPRATROPIUM BROMIDE AND ALBUTEROL SULFATE 1 AMPULE: 2.5; .5 SOLUTION RESPIRATORY (INHALATION) at 14:06

## 2019-12-14 RX ADMIN — ATORVASTATIN CALCIUM 40 MG: 40 TABLET, FILM COATED ORAL at 21:15

## 2019-12-14 RX ADMIN — SODIUM CHLORIDE: 9 INJECTION, SOLUTION INTRAVENOUS at 18:30

## 2019-12-14 RX ADMIN — LOSARTAN POTASSIUM 50 MG: 50 TABLET, FILM COATED ORAL at 08:16

## 2019-12-14 RX ADMIN — ROPINIROLE HYDROCHLORIDE 1 MG: 1 TABLET, FILM COATED ORAL at 21:15

## 2019-12-14 RX ADMIN — INSULIN GLARGINE 22 UNITS: 100 INJECTION, SOLUTION SUBCUTANEOUS at 08:16

## 2019-12-14 ASSESSMENT — PAIN SCALES - GENERAL
PAINLEVEL_OUTOF10: 0

## 2019-12-15 ENCOUNTER — APPOINTMENT (OUTPATIENT)
Dept: NUCLEAR MEDICINE | Age: 69
DRG: 247 | End: 2019-12-15
Attending: INTERNAL MEDICINE
Payer: MEDICARE

## 2019-12-15 LAB
ALBUMIN SERPL-MCNC: 4.1 G/DL (ref 3.5–5.2)
ALP BLD-CCNC: 58 U/L (ref 35–104)
ALT SERPL-CCNC: 14 U/L (ref 0–32)
ANION GAP SERPL CALCULATED.3IONS-SCNC: 14 MMOL/L (ref 7–16)
AST SERPL-CCNC: 20 U/L (ref 0–31)
BASOPHILS ABSOLUTE: 0.04 E9/L (ref 0–0.2)
BASOPHILS RELATIVE PERCENT: 0.6 % (ref 0–2)
BILIRUB SERPL-MCNC: 0.4 MG/DL (ref 0–1.2)
BUN BLDV-MCNC: 16 MG/DL (ref 8–23)
CALCIUM SERPL-MCNC: 9.2 MG/DL (ref 8.6–10.2)
CHLORIDE BLD-SCNC: 108 MMOL/L (ref 98–107)
CO2: 22 MMOL/L (ref 22–29)
CREAT SERPL-MCNC: 1.2 MG/DL (ref 0.5–1)
CREATININE URINE: 87 MG/DL (ref 29–226)
EOSINOPHILS ABSOLUTE: 0.21 E9/L (ref 0.05–0.5)
EOSINOPHILS RELATIVE PERCENT: 3.3 % (ref 0–6)
GFR AFRICAN AMERICAN: 54
GFR NON-AFRICAN AMERICAN: 44 ML/MIN/1.73
GLUCOSE BLD-MCNC: 97 MG/DL (ref 74–99)
HCT VFR BLD CALC: 38.8 % (ref 34–48)
HEMOGLOBIN: 11.5 G/DL (ref 11.5–15.5)
IMMATURE GRANULOCYTES #: 0.02 E9/L
IMMATURE GRANULOCYTES %: 0.3 % (ref 0–5)
LV EF: 58 %
LV EF: 62 %
LVEF MODALITY: NORMAL
LVEF MODALITY: NORMAL
LYMPHOCYTES ABSOLUTE: 1.77 E9/L (ref 1.5–4)
LYMPHOCYTES RELATIVE PERCENT: 27.8 % (ref 20–42)
MAGNESIUM: 1.8 MG/DL (ref 1.6–2.6)
MCH RBC QN AUTO: 26.6 PG (ref 26–35)
MCHC RBC AUTO-ENTMCNC: 29.6 % (ref 32–34.5)
MCV RBC AUTO: 89.6 FL (ref 80–99.9)
METER GLUCOSE: 102 MG/DL (ref 74–99)
METER GLUCOSE: 121 MG/DL (ref 74–99)
METER GLUCOSE: 128 MG/DL (ref 74–99)
METER GLUCOSE: 132 MG/DL (ref 74–99)
MICROALBUMIN UR-MCNC: <12 MG/L
MICROALBUMIN/CREAT UR-RTO: ABNORMAL (ref 0–30)
MONOCYTES ABSOLUTE: 0.59 E9/L (ref 0.1–0.95)
MONOCYTES RELATIVE PERCENT: 9.3 % (ref 2–12)
NEUTROPHILS ABSOLUTE: 3.74 E9/L (ref 1.8–7.3)
NEUTROPHILS RELATIVE PERCENT: 58.7 % (ref 43–80)
PDW BLD-RTO: 15.9 FL (ref 11.5–15)
PHOSPHORUS: 4.6 MG/DL (ref 2.5–4.5)
PLATELET # BLD: 241 E9/L (ref 130–450)
PMV BLD AUTO: 10.7 FL (ref 7–12)
POTASSIUM REFLEX MAGNESIUM: 4.4 MMOL/L (ref 3.5–5)
POTASSIUM SERPL-SCNC: 4.4 MMOL/L (ref 3.5–5)
RBC # BLD: 4.33 E12/L (ref 3.5–5.5)
SODIUM BLD-SCNC: 144 MMOL/L (ref 132–146)
TOTAL PROTEIN: 6.5 G/DL (ref 6.4–8.3)
WBC # BLD: 6.4 E9/L (ref 4.5–11.5)

## 2019-12-15 PROCEDURE — 36415 COLL VENOUS BLD VENIPUNCTURE: CPT

## 2019-12-15 PROCEDURE — 6370000000 HC RX 637 (ALT 250 FOR IP): Performed by: PHYSICIAN ASSISTANT

## 2019-12-15 PROCEDURE — 93018 CV STRESS TEST I&R ONLY: CPT | Performed by: INTERNAL MEDICINE

## 2019-12-15 PROCEDURE — 2140000000 HC CCU INTERMEDIATE R&B

## 2019-12-15 PROCEDURE — 2580000003 HC RX 258: Performed by: PHYSICIAN ASSISTANT

## 2019-12-15 PROCEDURE — 99233 SBSQ HOSP IP/OBS HIGH 50: CPT | Performed by: INTERNAL MEDICINE

## 2019-12-15 PROCEDURE — 80048 BASIC METABOLIC PNL TOTAL CA: CPT

## 2019-12-15 PROCEDURE — 85025 COMPLETE CBC W/AUTO DIFF WBC: CPT

## 2019-12-15 PROCEDURE — 78452 HT MUSCLE IMAGE SPECT MULT: CPT

## 2019-12-15 PROCEDURE — 93016 CV STRESS TEST SUPVJ ONLY: CPT | Performed by: INTERNAL MEDICINE

## 2019-12-15 PROCEDURE — 83735 ASSAY OF MAGNESIUM: CPT

## 2019-12-15 PROCEDURE — 84100 ASSAY OF PHOSPHORUS: CPT

## 2019-12-15 PROCEDURE — 6360000002 HC RX W HCPCS: Performed by: NURSE PRACTITIONER

## 2019-12-15 PROCEDURE — 3430000000 HC RX DIAGNOSTIC RADIOPHARMACEUTICAL: Performed by: RADIOLOGY

## 2019-12-15 PROCEDURE — 6370000000 HC RX 637 (ALT 250 FOR IP): Performed by: NURSE PRACTITIONER

## 2019-12-15 PROCEDURE — 80053 COMPREHEN METABOLIC PANEL: CPT

## 2019-12-15 PROCEDURE — 2580000003 HC RX 258: Performed by: INTERNAL MEDICINE

## 2019-12-15 PROCEDURE — 94640 AIRWAY INHALATION TREATMENT: CPT

## 2019-12-15 PROCEDURE — A9500 TC99M SESTAMIBI: HCPCS | Performed by: RADIOLOGY

## 2019-12-15 PROCEDURE — 93306 TTE W/DOPPLER COMPLETE: CPT

## 2019-12-15 PROCEDURE — 93017 CV STRESS TEST TRACING ONLY: CPT

## 2019-12-15 PROCEDURE — 82962 GLUCOSE BLOOD TEST: CPT

## 2019-12-15 RX ORDER — SODIUM CHLORIDE 9 MG/ML
INJECTION, SOLUTION INTRAVENOUS CONTINUOUS
Status: DISCONTINUED | OUTPATIENT
Start: 2019-12-15 | End: 2019-12-16 | Stop reason: SDUPTHER

## 2019-12-15 RX ORDER — SODIUM CHLORIDE 0.9 % (FLUSH) 0.9 %
10 SYRINGE (ML) INJECTION PRN
Status: DISCONTINUED | OUTPATIENT
Start: 2019-12-15 | End: 2019-12-16 | Stop reason: SDUPTHER

## 2019-12-15 RX ADMIN — INSULIN GLARGINE 22 UNITS: 100 INJECTION, SOLUTION SUBCUTANEOUS at 21:56

## 2019-12-15 RX ADMIN — SODIUM CHLORIDE: 9 INJECTION, SOLUTION INTRAVENOUS at 21:40

## 2019-12-15 RX ADMIN — ATORVASTATIN CALCIUM 40 MG: 40 TABLET, FILM COATED ORAL at 21:56

## 2019-12-15 RX ADMIN — OXYBUTYNIN CHLORIDE 10 MG: 5 TABLET ORAL at 07:55

## 2019-12-15 RX ADMIN — IPRATROPIUM BROMIDE AND ALBUTEROL SULFATE 1 AMPULE: 2.5; .5 SOLUTION RESPIRATORY (INHALATION) at 19:25

## 2019-12-15 RX ADMIN — LEVOTHYROXINE SODIUM 150 MCG: 150 TABLET ORAL at 07:28

## 2019-12-15 RX ADMIN — Medication 35 MILLICURIE: at 11:46

## 2019-12-15 RX ADMIN — LOSARTAN POTASSIUM 50 MG: 50 TABLET, FILM COATED ORAL at 07:54

## 2019-12-15 RX ADMIN — ROPINIROLE HYDROCHLORIDE 1 MG: 1 TABLET, FILM COATED ORAL at 21:56

## 2019-12-15 RX ADMIN — REGADENOSON 0.4 MG: 0.08 INJECTION, SOLUTION INTRAVENOUS at 11:45

## 2019-12-15 RX ADMIN — INSULIN GLARGINE 22 UNITS: 100 INJECTION, SOLUTION SUBCUTANEOUS at 13:40

## 2019-12-15 RX ADMIN — IPRATROPIUM BROMIDE AND ALBUTEROL SULFATE 1 AMPULE: 2.5; .5 SOLUTION RESPIRATORY (INHALATION) at 16:03

## 2019-12-15 RX ADMIN — ACETAMINOPHEN 650 MG: 325 TABLET, FILM COATED ORAL at 14:55

## 2019-12-15 RX ADMIN — POLYETHYLENE GLYCOL 3350 17 G: 17 POWDER, FOR SOLUTION ORAL at 21:55

## 2019-12-15 RX ADMIN — ASPIRIN 325 MG: 325 TABLET, FILM COATED ORAL at 07:54

## 2019-12-15 RX ADMIN — METFORMIN HYDROCHLORIDE 1000 MG: 1000 TABLET ORAL at 07:55

## 2019-12-15 RX ADMIN — METFORMIN HYDROCHLORIDE 1000 MG: 1000 TABLET ORAL at 17:22

## 2019-12-15 RX ADMIN — OXYBUTYNIN CHLORIDE 10 MG: 5 TABLET ORAL at 21:56

## 2019-12-15 RX ADMIN — SODIUM CHLORIDE, PRESERVATIVE FREE 10 ML: 5 INJECTION INTRAVENOUS at 07:55

## 2019-12-15 RX ADMIN — ROPINIROLE HYDROCHLORIDE 1 MG: 1 TABLET, FILM COATED ORAL at 07:54

## 2019-12-15 RX ADMIN — Medication 12 MILLICURIE: at 10:10

## 2019-12-15 RX ADMIN — SODIUM CHLORIDE, PRESERVATIVE FREE 10 ML: 5 INJECTION INTRAVENOUS at 21:56

## 2019-12-15 RX ADMIN — METOPROLOL SUCCINATE 25 MG: 25 TABLET, EXTENDED RELEASE ORAL at 07:54

## 2019-12-15 ASSESSMENT — PAIN SCALES - GENERAL
PAINLEVEL_OUTOF10: 5
PAINLEVEL_OUTOF10: 0

## 2019-12-16 ENCOUNTER — APPOINTMENT (OUTPATIENT)
Dept: CARDIAC CATH/INVASIVE PROCEDURES | Age: 69
DRG: 247 | End: 2019-12-16
Attending: INTERNAL MEDICINE
Payer: MEDICARE

## 2019-12-16 PROBLEM — R06.02 SHORTNESS OF BREATH: Status: RESOLVED | Noted: 2019-12-13 | Resolved: 2019-12-16

## 2019-12-16 PROBLEM — E66.01 MORBID OBESITY WITH BMI OF 40.0-44.9, ADULT (HCC): Chronic | Status: ACTIVE | Noted: 2019-12-16

## 2019-12-16 PROBLEM — E78.5 HYPERLIPIDEMIA LDL GOAL <100: Chronic | Status: ACTIVE | Noted: 2019-12-16

## 2019-12-16 PROBLEM — N18.30 CKD (CHRONIC KIDNEY DISEASE) STAGE 3, GFR 30-59 ML/MIN (HCC): Chronic | Status: ACTIVE | Noted: 2019-12-16

## 2019-12-16 PROBLEM — I21.4 NSTEMI (NON-ST ELEVATED MYOCARDIAL INFARCTION) (HCC): Status: RESOLVED | Noted: 2019-12-13 | Resolved: 2019-12-16

## 2019-12-16 LAB
ABO/RH: NORMAL
ALBUMIN SERPL-MCNC: 3.3 G/DL (ref 3.5–5.2)
ALP BLD-CCNC: 51 U/L (ref 35–104)
ALT SERPL-CCNC: 17 U/L (ref 0–32)
ANION GAP SERPL CALCULATED.3IONS-SCNC: 13 MMOL/L (ref 7–16)
ANTIBODY SCREEN: NORMAL
AST SERPL-CCNC: 51 U/L (ref 0–31)
BILIRUB SERPL-MCNC: 0.4 MG/DL (ref 0–1.2)
BUN BLDV-MCNC: 18 MG/DL (ref 8–23)
CALCIUM SERPL-MCNC: 8.8 MG/DL (ref 8.6–10.2)
CHLORIDE BLD-SCNC: 112 MMOL/L (ref 98–107)
CO2: 18 MMOL/L (ref 22–29)
CREAT SERPL-MCNC: 1 MG/DL (ref 0.5–1)
GFR AFRICAN AMERICAN: >60
GFR NON-AFRICAN AMERICAN: 55 ML/MIN/1.73
GLUCOSE BLD-MCNC: 63 MG/DL (ref 74–99)
METER GLUCOSE: 58 MG/DL (ref 74–99)
METER GLUCOSE: 66 MG/DL (ref 74–99)
METER GLUCOSE: 76 MG/DL (ref 74–99)
METER GLUCOSE: 80 MG/DL (ref 74–99)
METER GLUCOSE: 83 MG/DL (ref 74–99)
METER GLUCOSE: 90 MG/DL (ref 74–99)
PHOSPHORUS: 4.9 MG/DL (ref 2.5–4.5)
POTASSIUM REFLEX MAGNESIUM: 4.9 MMOL/L (ref 3.5–5)
POTASSIUM SERPL-SCNC: 4.9 MMOL/L (ref 3.5–5)
SODIUM BLD-SCNC: 143 MMOL/L (ref 132–146)
TOTAL PROTEIN: 5.9 G/DL (ref 6.4–8.3)

## 2019-12-16 PROCEDURE — 36415 COLL VENOUS BLD VENIPUNCTURE: CPT

## 2019-12-16 PROCEDURE — 84100 ASSAY OF PHOSPHORUS: CPT

## 2019-12-16 PROCEDURE — 6370000000 HC RX 637 (ALT 250 FOR IP): Performed by: PHYSICIAN ASSISTANT

## 2019-12-16 PROCEDURE — 86900 BLOOD TYPING SEROLOGIC ABO: CPT

## 2019-12-16 PROCEDURE — C1894 INTRO/SHEATH, NON-LASER: HCPCS

## 2019-12-16 PROCEDURE — 2140000000 HC CCU INTERMEDIATE R&B

## 2019-12-16 PROCEDURE — 82962 GLUCOSE BLOOD TEST: CPT

## 2019-12-16 PROCEDURE — 6370000000 HC RX 637 (ALT 250 FOR IP): Performed by: NURSE PRACTITIONER

## 2019-12-16 PROCEDURE — C1887 CATHETER, GUIDING: HCPCS

## 2019-12-16 PROCEDURE — 6360000002 HC RX W HCPCS

## 2019-12-16 PROCEDURE — C1769 GUIDE WIRE: HCPCS

## 2019-12-16 PROCEDURE — 2709999900 HC NON-CHARGEABLE SUPPLY

## 2019-12-16 PROCEDURE — 6370000000 HC RX 637 (ALT 250 FOR IP): Performed by: INTERNAL MEDICINE

## 2019-12-16 PROCEDURE — 2580000003 HC RX 258: Performed by: PHYSICIAN ASSISTANT

## 2019-12-16 PROCEDURE — 027034Z DILATION OF CORONARY ARTERY, ONE ARTERY WITH DRUG-ELUTING INTRALUMINAL DEVICE, PERCUTANEOUS APPROACH: ICD-10-PCS | Performed by: INTERNAL MEDICINE

## 2019-12-16 PROCEDURE — 93458 L HRT ARTERY/VENTRICLE ANGIO: CPT | Performed by: INTERNAL MEDICINE

## 2019-12-16 PROCEDURE — C9600 PERC DRUG-EL COR STENT SING: HCPCS

## 2019-12-16 PROCEDURE — B2111ZZ FLUOROSCOPY OF MULTIPLE CORONARY ARTERIES USING LOW OSMOLAR CONTRAST: ICD-10-PCS | Performed by: INTERNAL MEDICINE

## 2019-12-16 PROCEDURE — 97165 OT EVAL LOW COMPLEX 30 MIN: CPT

## 2019-12-16 PROCEDURE — 80053 COMPREHEN METABOLIC PANEL: CPT

## 2019-12-16 PROCEDURE — 94640 AIRWAY INHALATION TREATMENT: CPT

## 2019-12-16 PROCEDURE — 86850 RBC ANTIBODY SCREEN: CPT

## 2019-12-16 PROCEDURE — 6370000000 HC RX 637 (ALT 250 FOR IP)

## 2019-12-16 PROCEDURE — 4A023N7 MEASUREMENT OF CARDIAC SAMPLING AND PRESSURE, LEFT HEART, PERCUTANEOUS APPROACH: ICD-10-PCS | Performed by: INTERNAL MEDICINE

## 2019-12-16 PROCEDURE — 92928 PRQ TCAT PLMT NTRAC ST 1 LES: CPT | Performed by: INTERNAL MEDICINE

## 2019-12-16 PROCEDURE — C1725 CATH, TRANSLUMIN NON-LASER: HCPCS

## 2019-12-16 PROCEDURE — C1874 STENT, COATED/COV W/DEL SYS: HCPCS

## 2019-12-16 PROCEDURE — 93458 L HRT ARTERY/VENTRICLE ANGIO: CPT

## 2019-12-16 PROCEDURE — 80048 BASIC METABOLIC PNL TOTAL CA: CPT

## 2019-12-16 PROCEDURE — 2500000003 HC RX 250 WO HCPCS

## 2019-12-16 PROCEDURE — 2580000003 HC RX 258: Performed by: INTERNAL MEDICINE

## 2019-12-16 PROCEDURE — 86901 BLOOD TYPING SEROLOGIC RH(D): CPT

## 2019-12-16 RX ORDER — ASPIRIN 81 MG/1
81 TABLET ORAL DAILY
Status: DISCONTINUED | OUTPATIENT
Start: 2019-12-17 | End: 2019-12-17 | Stop reason: HOSPADM

## 2019-12-16 RX ORDER — SODIUM CHLORIDE 0.9 % (FLUSH) 0.9 %
10 SYRINGE (ML) INJECTION PRN
Status: DISCONTINUED | OUTPATIENT
Start: 2019-12-16 | End: 2019-12-17 | Stop reason: HOSPADM

## 2019-12-16 RX ORDER — ACETAMINOPHEN 325 MG/1
650 TABLET ORAL EVERY 4 HOURS PRN
Status: DISCONTINUED | OUTPATIENT
Start: 2019-12-16 | End: 2019-12-17 | Stop reason: HOSPADM

## 2019-12-16 RX ORDER — SODIUM CHLORIDE 9 MG/ML
INJECTION, SOLUTION INTRAVENOUS CONTINUOUS
Status: ACTIVE | OUTPATIENT
Start: 2019-12-16 | End: 2019-12-16

## 2019-12-16 RX ADMIN — DEXTROSE MONOHYDRATE 12.5 G: 500 INJECTION PARENTERAL at 12:35

## 2019-12-16 RX ADMIN — ROPINIROLE HYDROCHLORIDE 1 MG: 1 TABLET, FILM COATED ORAL at 09:16

## 2019-12-16 RX ADMIN — METOPROLOL SUCCINATE 25 MG: 25 TABLET, EXTENDED RELEASE ORAL at 09:15

## 2019-12-16 RX ADMIN — INSULIN GLARGINE 22 UNITS: 100 INJECTION, SOLUTION SUBCUTANEOUS at 22:05

## 2019-12-16 RX ADMIN — ROPINIROLE HYDROCHLORIDE 1 MG: 1 TABLET, FILM COATED ORAL at 22:04

## 2019-12-16 RX ADMIN — CEPHALEXIN 250 MG: 250 CAPSULE ORAL at 09:16

## 2019-12-16 RX ADMIN — SODIUM CHLORIDE: 9 INJECTION, SOLUTION INTRAVENOUS at 15:37

## 2019-12-16 RX ADMIN — LEVOTHYROXINE SODIUM 150 MCG: 150 TABLET ORAL at 06:26

## 2019-12-16 RX ADMIN — OXYBUTYNIN CHLORIDE 10 MG: 5 TABLET ORAL at 22:14

## 2019-12-16 RX ADMIN — OXYBUTYNIN CHLORIDE 10 MG: 5 TABLET ORAL at 09:16

## 2019-12-16 RX ADMIN — TICAGRELOR 90 MG: 90 TABLET ORAL at 22:05

## 2019-12-16 RX ADMIN — SODIUM CHLORIDE, PRESERVATIVE FREE 10 ML: 5 INJECTION INTRAVENOUS at 22:04

## 2019-12-16 RX ADMIN — ATORVASTATIN CALCIUM 40 MG: 40 TABLET, FILM COATED ORAL at 22:05

## 2019-12-16 RX ADMIN — IPRATROPIUM BROMIDE AND ALBUTEROL SULFATE 1 AMPULE: 2.5; .5 SOLUTION RESPIRATORY (INHALATION) at 17:24

## 2019-12-16 RX ADMIN — ASPIRIN 325 MG: 325 TABLET, FILM COATED ORAL at 09:15

## 2019-12-16 RX ADMIN — LOSARTAN POTASSIUM 50 MG: 50 TABLET, FILM COATED ORAL at 09:15

## 2019-12-16 ASSESSMENT — PAIN SCALES - GENERAL
PAINLEVEL_OUTOF10: 0

## 2019-12-17 ENCOUNTER — TELEPHONE (OUTPATIENT)
Dept: ADMINISTRATIVE | Age: 69
End: 2019-12-17

## 2019-12-17 ENCOUNTER — TELEPHONE (OUTPATIENT)
Dept: CARDIAC CATH/INVASIVE PROCEDURES | Age: 69
End: 2019-12-17

## 2019-12-17 VITALS
SYSTOLIC BLOOD PRESSURE: 152 MMHG | WEIGHT: 257.4 LBS | BODY MASS INDEX: 41.37 KG/M2 | HEIGHT: 66 IN | DIASTOLIC BLOOD PRESSURE: 113 MMHG | RESPIRATION RATE: 18 BRPM | TEMPERATURE: 97.7 F | OXYGEN SATURATION: 98 % | HEART RATE: 73 BPM

## 2019-12-17 LAB
ANION GAP SERPL CALCULATED.3IONS-SCNC: 17 MMOL/L (ref 7–16)
BASOPHILS ABSOLUTE: 0.04 E9/L (ref 0–0.2)
BASOPHILS RELATIVE PERCENT: 0.6 % (ref 0–2)
BUN BLDV-MCNC: 16 MG/DL (ref 8–23)
CALCIUM SERPL-MCNC: 9.2 MG/DL (ref 8.6–10.2)
CHLORIDE BLD-SCNC: 111 MMOL/L (ref 98–107)
CO2: 17 MMOL/L (ref 22–29)
CREAT SERPL-MCNC: 1.1 MG/DL (ref 0.5–1)
EOSINOPHILS ABSOLUTE: 0.21 E9/L (ref 0.05–0.5)
EOSINOPHILS RELATIVE PERCENT: 3.3 % (ref 0–6)
GFR AFRICAN AMERICAN: 59
GFR NON-AFRICAN AMERICAN: 49 ML/MIN/1.73
GLUCOSE BLD-MCNC: 81 MG/DL (ref 74–99)
HCT VFR BLD CALC: 37.1 % (ref 34–48)
HEMOGLOBIN: 11 G/DL (ref 11.5–15.5)
IMMATURE GRANULOCYTES #: 0.03 E9/L
IMMATURE GRANULOCYTES %: 0.5 % (ref 0–5)
LYMPHOCYTES ABSOLUTE: 0.87 E9/L (ref 1.5–4)
LYMPHOCYTES RELATIVE PERCENT: 13.8 % (ref 20–42)
MAGNESIUM: 1.7 MG/DL (ref 1.6–2.6)
MCH RBC QN AUTO: 26.1 PG (ref 26–35)
MCHC RBC AUTO-ENTMCNC: 29.6 % (ref 32–34.5)
MCV RBC AUTO: 88.1 FL (ref 80–99.9)
METER GLUCOSE: 130 MG/DL (ref 74–99)
METER GLUCOSE: 80 MG/DL (ref 74–99)
MONOCYTES ABSOLUTE: 0.59 E9/L (ref 0.1–0.95)
MONOCYTES RELATIVE PERCENT: 9.4 % (ref 2–12)
NEUTROPHILS ABSOLUTE: 4.56 E9/L (ref 1.8–7.3)
NEUTROPHILS RELATIVE PERCENT: 72.4 % (ref 43–80)
PDW BLD-RTO: 15.9 FL (ref 11.5–15)
PLATELET # BLD: 214 E9/L (ref 130–450)
PMV BLD AUTO: 10.8 FL (ref 7–12)
POTASSIUM SERPL-SCNC: 3.9 MMOL/L (ref 3.5–5)
RBC # BLD: 4.21 E12/L (ref 3.5–5.5)
SODIUM BLD-SCNC: 145 MMOL/L (ref 132–146)
WBC # BLD: 6.3 E9/L (ref 4.5–11.5)

## 2019-12-17 PROCEDURE — 99232 SBSQ HOSP IP/OBS MODERATE 35: CPT | Performed by: INTERNAL MEDICINE

## 2019-12-17 PROCEDURE — 85025 COMPLETE CBC W/AUTO DIFF WBC: CPT

## 2019-12-17 PROCEDURE — 94640 AIRWAY INHALATION TREATMENT: CPT

## 2019-12-17 PROCEDURE — 83735 ASSAY OF MAGNESIUM: CPT

## 2019-12-17 PROCEDURE — 6370000000 HC RX 637 (ALT 250 FOR IP): Performed by: PHYSICIAN ASSISTANT

## 2019-12-17 PROCEDURE — 80048 BASIC METABOLIC PNL TOTAL CA: CPT

## 2019-12-17 PROCEDURE — 6370000000 HC RX 637 (ALT 250 FOR IP): Performed by: INTERNAL MEDICINE

## 2019-12-17 PROCEDURE — 82962 GLUCOSE BLOOD TEST: CPT

## 2019-12-17 PROCEDURE — 36415 COLL VENOUS BLD VENIPUNCTURE: CPT

## 2019-12-17 RX ORDER — METOPROLOL SUCCINATE 25 MG/1
25 TABLET, EXTENDED RELEASE ORAL DAILY
Qty: 30 TABLET | Refills: 0 | Status: SHIPPED | OUTPATIENT
Start: 2019-12-17 | End: 2019-12-17 | Stop reason: HOSPADM

## 2019-12-17 RX ORDER — METFORMIN HYDROCHLORIDE 500 MG/1
1000 TABLET, EXTENDED RELEASE ORAL 2 TIMES DAILY WITH MEALS
COMMUNITY

## 2019-12-17 RX ORDER — ASPIRIN 81 MG/1
81 TABLET ORAL DAILY
Qty: 30 TABLET | Refills: 0 | Status: SHIPPED | OUTPATIENT
Start: 2019-12-17

## 2019-12-17 RX ADMIN — OXYBUTYNIN CHLORIDE 10 MG: 5 TABLET ORAL at 09:42

## 2019-12-17 RX ADMIN — LOSARTAN POTASSIUM 50 MG: 50 TABLET, FILM COATED ORAL at 09:42

## 2019-12-17 RX ADMIN — INSULIN GLARGINE 22 UNITS: 100 INJECTION, SOLUTION SUBCUTANEOUS at 09:45

## 2019-12-17 RX ADMIN — IPRATROPIUM BROMIDE AND ALBUTEROL SULFATE 1 AMPULE: 2.5; .5 SOLUTION RESPIRATORY (INHALATION) at 09:10

## 2019-12-17 RX ADMIN — TICAGRELOR 90 MG: 90 TABLET ORAL at 09:42

## 2019-12-17 RX ADMIN — ROPINIROLE HYDROCHLORIDE 1 MG: 1 TABLET, FILM COATED ORAL at 09:42

## 2019-12-17 RX ADMIN — LEVOTHYROXINE SODIUM 150 MCG: 150 TABLET ORAL at 07:09

## 2019-12-17 ASSESSMENT — PAIN SCALES - GENERAL
PAINLEVEL_OUTOF10: 0
PAINLEVEL_OUTOF10: 0

## 2019-12-18 ENCOUNTER — CLINICAL DOCUMENTATION (OUTPATIENT)
Dept: CARDIAC CATH/INVASIVE PROCEDURES | Age: 69
End: 2019-12-18

## 2020-01-02 ENCOUNTER — OFFICE VISIT (OUTPATIENT)
Dept: CARDIOLOGY CLINIC | Age: 70
End: 2020-01-02
Payer: MEDICARE

## 2020-01-02 VITALS
RESPIRATION RATE: 16 BRPM | WEIGHT: 245 LBS | HEIGHT: 66 IN | SYSTOLIC BLOOD PRESSURE: 132 MMHG | DIASTOLIC BLOOD PRESSURE: 82 MMHG | BODY MASS INDEX: 39.37 KG/M2 | HEART RATE: 67 BPM

## 2020-01-02 PROCEDURE — 99214 OFFICE O/P EST MOD 30 MIN: CPT | Performed by: INTERNAL MEDICINE

## 2020-01-02 PROCEDURE — 93000 ELECTROCARDIOGRAM COMPLETE: CPT | Performed by: INTERNAL MEDICINE

## 2020-01-02 RX ORDER — POTASSIUM CITRATE 15 MEQ/1
1 TABLET, EXTENDED RELEASE ORAL DAILY
COMMUNITY
Start: 2019-12-24

## 2020-01-02 RX ORDER — INSULIN GLARGINE 300 U/ML
22 INJECTION, SOLUTION SUBCUTANEOUS 2 TIMES DAILY
COMMUNITY
Start: 2019-12-24

## 2020-01-05 NOTE — PROGRESS NOTES
LIPOMA RESECTION       RIGHTCALF AND UPPER LEFT ARM    OTHER SURGICAL HISTORY N/A 10/24/14    EXPLANT OF UROTSTIM IMPLANT OF PERIPHERAL NEROSTIM PULSE GENERATOR    ROTATOR CUFF REPAIR      left       Family History:  Family History   Problem Relation Age of Onset    Heart Attack Father 48     Father -- passed away from an MI at age 64  Sister (3years older than patient) -- CAD s/p 3V CABG    Social History:  Social History     Socioeconomic History    Marital status:      Spouse name: Not on file    Number of children: Not on file    Years of education: Not on file    Highest education level: Not on file   Occupational History    Not on file   Social Needs    Financial resource strain: Not on file    Food insecurity:     Worry: Not on file     Inability: Not on file    Transportation needs:     Medical: Not on file     Non-medical: Not on file   Tobacco Use    Smoking status: Never Smoker    Smokeless tobacco: Never Used   Substance and Sexual Activity    Alcohol use: No    Drug use: No    Sexual activity: Not on file   Lifestyle    Physical activity:     Days per week: Not on file     Minutes per session: Not on file    Stress: Not on file   Relationships    Social connections:     Talks on phone: Not on file     Gets together: Not on file     Attends Evangelical service: Not on file     Active member of club or organization: Not on file     Attends meetings of clubs or organizations: Not on file     Relationship status: Not on file    Intimate partner violence:     Fear of current or ex partner: Not on file     Emotionally abused: Not on file     Physically abused: Not on file     Forced sexual activity: Not on file   Other Topics Concern    Not on file   Social History Narrative    Not on file       Allergies:   Allergies   Allergen Reactions    Dye [Iodides] Other (See Comments)     Renal failure      Levaquin [Levofloxacin]     Penicillins Swelling and Rash    Sulfa Antibiotics Swelling and Rash       Current Medications:  Current Outpatient Medications   Medication Sig Dispense Refill    TOUJEO SOLOSTAR 300 UNIT/ML SOPN 22 Units 2 times daily       Potassium Citrate ER 15 MEQ (1620 MG) TBCR       aspirin 81 MG EC tablet Take 1 tablet by mouth daily 30 tablet 0    ticagrelor (BRILINTA) 90 MG TABS tablet Take 1 tablet by mouth 2 times daily 60 tablet 0    vitamin D (CHOLECALCIFEROL) 125 MCG (5000 UT) CAPS capsule Take 5,000 Units by mouth daily      metFORMIN (GLUCOPHAGE-XR) 500 MG extended release tablet Take 1,000 mg by mouth 2 times daily (with meals)      cephALEXin (KEFLEX) 250 MG capsule Take 250 mg by mouth Twice a Week      levothyroxine (SYNTHROID) 150 MCG tablet Take 150 mcg by mouth Daily      Semaglutide (OZEMPIC, 0.25 OR 0.5 MG/DOSE, SC) Inject 0.5 mg into the skin once a week      Estradiol (VAGIFEM) 10 MCG TABS vaginal tablet Place 10 mcg vaginally Twice a Week      docusate sodium (STOOL SOFTENER) 100 MG capsule Take 100 mg by mouth nightly      cyanocobalamin 1000 MCG/ML injection Inject 1,000 mcg into the muscle every 30 days       Cranberry 500 MG CAPS Take 500 mg by mouth daily       Alpha-Lipoic Acid 600 MG CAPS Take 600 mg by mouth daily       polyethylene glycol (GLYCOLAX) packet Take 17 g by mouth daily       Multiple Vitamins-Minerals (CENTRUM SILVER ADULT 50+ PO) Take by mouth daily       oxybutynin (DITROPAN) 5 MG tablet Take 10 mg by mouth 2 times daily Take two in the morning and two at night       ammonium lactate (LAC-HYDRIN) 12 % lotion Apply  topically daily. Apply topically as needed.  niacin 500 MG tablet Take 500 mg by mouth daily (with breakfast)       losartan (COZAAR) 50 MG tablet Take 50 mg by mouth daily       fluticasone (FLONASE) 50 MCG/ACT nasal spray 2 sprays by Nasal route daily as needed       Insulin Lispro, Human, (HUMALOG PEN SC) Inject  into the skin.  SLIDING SCALE BASE    120-180 27units in am and 33 units at hs  atorvastatin (LIPITOR) 40 MG tablet Take 40 mg by mouth every evening       ropinirole (REQUIP) 1 MG tablet Take 1 mg by mouth 2 times daily       calcium carbonate (OSCAL) 500 MG TABS tablet Take 600 mg by mouth daily       insulin detemir (LEVEMIR) 100 UNIT/ML injection Inject 22 Units into the skin 2 times daily        No current facility-administered medications for this visit. Physical Exam:  /82   Pulse 67   Resp 16   Ht 5' 6\" (1.676 m)   Wt 245 lb (111.1 kg)   BMI 39.54 kg/m²   Wt Readings from Last 3 Encounters:   01/02/20 245 lb (111.1 kg)   12/17/19 257 lb 6.4 oz (116.8 kg)   12/13/19 250 lb (113.4 kg)     Appearance: Awake, alert and oriented x 3, no acute respiratory distress  Skin: Intact, no rash  Head: Normocephalic, atraumatic  Eyes: EOMI, no conjunctival erythema  ENMT: No pharyngeal erythema, MMM, no rhinorrhea  Neck: Supple, no carotid bruits  Lungs: Clear to auscultation bilaterally. No wheezes, rales, or rhonchi.   Cardiac: Regular rate and rhythm, +S1S2, no murmurs apparent  Abdomen: Soft, nontender, +bowel sounds  Extremities: Moves all extremities x 4, no lower extremity edema  Neurologic: No focal motor deficits apparent, normal mood and affect, alert and oriented x 3    Laboratory Tests:  Lab Results   Component Value Date    CREATININE 1.1 (H) 12/17/2019    BUN 16 12/17/2019     12/17/2019    K 3.9 12/17/2019     (H) 12/17/2019    CO2 17 (L) 12/17/2019     Lab Results   Component Value Date    MG 1.7 12/17/2019     Lab Results   Component Value Date    WBC 6.3 12/17/2019    HGB 11.0 (L) 12/17/2019    HCT 37.1 12/17/2019    MCV 88.1 12/17/2019     12/17/2019     Lab Results   Component Value Date    ALT 17 12/16/2019    AST 51 (H) 12/16/2019    ALKPHOS 51 12/16/2019    BILITOT 0.4 12/16/2019     Lab Results   Component Value Date    CKTOTAL 71 09/04/2011    CKMB 2.5 09/04/2011    TROPONINI <0.01 12/14/2019    TROPONINI <0.01 12/13/2019    TROPONINI stones  9. [place] -- compliant with BiPAP  10. [facility] of CAD  11. History of left rotator cuff repair  12. S/p thyroidectomy -- on thyroid replacement  13.  History of sinus pauses while hospitalized in 12/2019 (on BB at the time, +[place] -- BB stopped during hospitalization)    - Prior cardiac studies reviewed today  - Referral for cardiac rehab placed today  - Continue current medications (including ASA, brilinta, statin, and ARB)  - Aggressive risk factor modifications  - Treatment of [place]    Lindy Gutiérrez MD  [facility]) Cardiology

## 2020-02-05 ENCOUNTER — HOSPITAL ENCOUNTER (OUTPATIENT)
Dept: ULTRASOUND IMAGING | Age: 70
Discharge: HOME | End: 2020-02-07
Payer: MEDICARE

## 2020-02-05 PROCEDURE — 76770 US EXAM ABDO BACK WALL COMP: CPT

## 2020-05-06 ENCOUNTER — TELEPHONE (OUTPATIENT)
Dept: ADMINISTRATIVE | Age: 70
End: 2020-05-06

## 2020-05-06 NOTE — TELEPHONE ENCOUNTER
Pt called in to set up 6 mo FU in June w/ Dr Guido Burkitt.  Please call her back at [phone] when his schedule is open.

## 2020-06-01 ENCOUNTER — OFFICE VISIT (OUTPATIENT)
Dept: CARDIOLOGY CLINIC | Age: 70
End: 2020-06-01
Payer: MEDICARE

## 2020-06-01 VITALS
SYSTOLIC BLOOD PRESSURE: 138 MMHG | HEART RATE: 70 BPM | WEIGHT: 240.5 LBS | RESPIRATION RATE: 18 BRPM | HEIGHT: 66 IN | BODY MASS INDEX: 38.65 KG/M2 | DIASTOLIC BLOOD PRESSURE: 72 MMHG | TEMPERATURE: 97.9 F

## 2020-06-01 PROCEDURE — 99214 OFFICE O/P EST MOD 30 MIN: CPT | Performed by: INTERNAL MEDICINE

## 2020-06-01 PROCEDURE — 93000 ELECTROCARDIOGRAM COMPLETE: CPT | Performed by: INTERNAL MEDICINE

## 2020-06-01 NOTE — PROGRESS NOTES
OTHER SURGICAL HISTORY N/A 10/24/14    EXPLANT OF UROTSTIM IMPLANT OF PERIPHERAL NEROSTIM PULSE GENERATOR    ROTATOR CUFF REPAIR      left       Family History:  Family History   Problem Relation Age of Onset    Heart Attack Father 48     Father -- passed away from an MI at age 64  Sister (3years older than patient) -- CAD s/p 3V CABG    Social History:  Social History     Socioeconomic History    Marital status:      Spouse name: Not on file    Number of children: Not on file    Years of education: Not on file    Highest education level: Not on file   Occupational History    Not on file   Social Needs    Financial resource strain: Not on file    Food insecurity     Worry: Not on file     Inability: Not on file    Transportation needs     Medical: Not on file     Non-medical: Not on file   Tobacco Use    Smoking status: Never Smoker    Smokeless tobacco: Never Used   Substance and Sexual Activity    Alcohol use: No    Drug use: No    Sexual activity: Not on file   Lifestyle    Physical activity     Days per week: Not on file     Minutes per session: Not on file    Stress: Not on file   Relationships    Social connections     Talks on phone: Not on file     Gets together: Not on file     Attends Am[employer] service: Not on file     Active member of club or organization: Not on file     Attends meetings of clubs or organizations: Not on file     Relationship status: Not on file    Intimate partner violence     Fear of current or ex partner: Not on file     Emotionally abused: Not on file     Physically abused: Not on file     Forced sexual activity: Not on file   Other Topics Concern    Not on file   Social History Narrative    Not on file       Allergies:   Allergies   Allergen Reactions    Dye [Iodides] Other (See Comments)     Renal failure      Levaquin [Levofloxacin]     Penicillins Swelling and Rash    Sulfa Antibiotics Swelling and Rash       Current Medications:  Current stones  10. [place] -- compliant with BiPAP  11. [facility] of CAD  12. History of left rotator cuff repair  12. S/p thyroidectomy -- on thyroid replacement  13.  History of sinus pauses while hospitalized in 12/2019 (on BB at the time, +[place] -- BB stopped during hospitalization)    - Prior cardiac studies reviewed  - Referral for cardiac rehab placed at last office visit in 1.2020 (patient deferred)  - Continue current medications (including ASA, brilinta, statin, and ARB) -- discussed option of switching brilinta to a different anti-platelet agent and monitoring respiratory status (continue brilinta)  - Aggressive risk factor modifications  - Treatment of [place] (results of recent repeat sleep study pending -- it was performed at [facility])    Marylouise Runner, MD  [facility] ([facility]) Cardiology

## 2020-09-16 ENCOUNTER — HOSPITAL ENCOUNTER (OUTPATIENT)
Age: 70
Discharge: HOME | End: 2020-09-18
Payer: MEDICARE

## 2020-09-16 PROCEDURE — 87088 URINE BACTERIA CULTURE: CPT

## 2020-09-16 PROCEDURE — 87186 SC STD MICRODIL/AGAR DIL: CPT

## 2020-09-19 LAB
ORGANISM: ABNORMAL
URINE CULTURE, ROUTINE: ABNORMAL

## 2020-12-09 ENCOUNTER — OFFICE VISIT (OUTPATIENT)
Dept: CARDIOLOGY CLINIC | Age: 70
End: 2020-12-09
Payer: MEDICARE

## 2020-12-09 VITALS
WEIGHT: 244.4 LBS | HEART RATE: 65 BPM | HEIGHT: 66 IN | BODY MASS INDEX: 39.28 KG/M2 | SYSTOLIC BLOOD PRESSURE: 98 MMHG | DIASTOLIC BLOOD PRESSURE: 58 MMHG | RESPIRATION RATE: 18 BRPM

## 2020-12-09 PROCEDURE — 99214 OFFICE O/P EST MOD 30 MIN: CPT | Performed by: INTERNAL MEDICINE

## 2020-12-09 PROCEDURE — 93000 ELECTROCARDIOGRAM COMPLETE: CPT | Performed by: INTERNAL MEDICINE

## 2020-12-09 NOTE — PROGRESS NOTES
OUTPATIENT CARDIOLOGY FOLLOW-UP    Name: King Dionne    Age: 69 y.o. Primary Care Physician: Tabby Pacheco MD    Date of Service: 12/9/2020    Chief Complaint: Follow-up for CAD s/p PCI    Interim History:  CAD s/p PCI in 12/2019. She denies recent chest pain, palpitations, syncope, or orthopnea. +long-standing intermittent SOB (not necessarily associated to exertion, not significantly worse on brilinta, +present prior to/after PCI, stable). No new cardiac complaints. SR on EKG.     Review of Systems:   Cardiac: As per HPI  General: No fever, chills  Pulmonary: As per HPI  HEENT: No visual disturbances, difficult swallowing  GI: No nausea, vomiting  : No dysuria, hematuria  Endocrine: +thyroid disorder, +DM  Musculoskeletal: HENRY x 4, no focal motor deficits  Skin: Intact, no rashes  Neuro: No headache, seizures  Psych: Currently with no depression, anxiety    Past Medical History:  Past Medical History:   Diagnosis Date    Arthritis     Cataract of left eye     Diabetes mellitus ([address]     STABLE PER PT    Hyperlipidemia     Hypertension     STABLE PER PT    Lip lesion     uses dermatology creas for precancer of lip & section of face near eyes    Prolonged emergence from general anesthesia     Restless leg syndrome     Sleep apnea     uses bipap       Past Surgical History:  Past Surgical History:   Procedure Laterality Date    CARDIAC CATHETERIZATION  12/16/2019    Dr. Yara Quintero- [facility] [place] PERRY LAD 3.0 x 26    CARPAL TUNNEL RELEASE      bilateral    CATARACT REMOVAL WITH IMPLANT Left 02/07/2017    CATARACT REMOVAL WITH IMPLANT Right 04/06/2017    COLONOSCOPY      CYSTOSCOPY  03/02/2012    retrograde and cultures    CYSTOSCOPY  03/14/2012    cysto, laser, litho, stent placement    CYSTOSCOPY  4/22/2014    laser litho, stent insertion    FRACTURE SURGERY      orif left foot    HAMMER TOE SURGERY Left     HERNIA REPAIR      umbilical hernia    HYSTERECTOMY      PARTIAL    KIDNEY STONE SURGERY      LIPOMA RESECTION       RIGHTCALF AND UPPER LEFT ARM    OTHER SURGICAL HISTORY N/A 10/24/14    EXPLANT OF UROTSTIM IMPLANT OF PERIPHERAL NEROSTIM PULSE GENERATOR    ROTATOR CUFF REPAIR      left       Family History:  Family History   Problem Relation Age of Onset    Heart Attack Father 48     Father -- passed away from an MI at age 64  Sister (3years older than patient) -- CAD s/p 3V CABG    Social History:  Social History     Socioeconomic History    Marital status:      Spouse name: Not on file    Number of children: Not on file    Years of education: Not on file    Highest education level: Not on file   Occupational History    Not on file   Social Needs    Financial resource strain: Not on file    Food insecurity     Worry: Not on file     Inability: Not on file    Transportation needs     Medical: Not on file     Non-medical: Not on file   Tobacco Use    Smoking status: Never Smoker    Smokeless tobacco: Never Used   Substance and Sexual Activity    Alcohol use: No    Drug use: No    Sexual activity: Not on file   Lifestyle    Physical activity     Days per week: Not on file     Minutes per session: Not on file    Stress: Not on file   Relationships    Social connections     Talks on phone: Not on file     Gets together: Not on file     Attends Jain service: Not on file     Active member of club or organization: Not on file     Attends meetings of clubs or organizations: Not on file     Relationship status: Not on file    Intimate partner violence     Fear of current or ex partner: Not on file     Emotionally abused: Not on file     Physically abused: Not on file     Forced sexual activity: Not on file   Other Topics Concern    Not on file   Social History Narrative    Not on file       Allergies:   Allergies   Allergen Reactions    Dye [Iodides] Other (See Comments)     Renal failure      Levaquin [Levofloxacin]     Penicillins Swelling and Rash    Sulfa Antibiotics Swelling and Rash       Current Medications:  Current Outpatient Medications   Medication Sig Dispense Refill    TOUJEO SOLOSTAR 300 UNIT/ML SOPN 22 Units 2 times daily       Potassium Citrate ER 15 MEQ (1620 MG) TBCR       aspirin 81 MG EC tablet Take 1 tablet by mouth daily 30 tablet 0    ticagrelor (BRILINTA) 90 MG TABS tablet Take 1 tablet by mouth 2 times daily 60 tablet 0    vitamin D (CHOLECALCIFEROL) 125 MCG (5000 UT) CAPS capsule Take 5,000 Units by mouth daily      metFORMIN (GLUCOPHAGE-XR) 500 MG extended release tablet Take 1,000 mg by mouth 2 times daily (with meals)      cephALEXin (KEFLEX) 250 MG capsule Take 250 mg by mouth Twice a Week      levothyroxine (SYNTHROID) 125 MCG tablet Take 125 mcg by mouth Daily       Semaglutide (OZEMPIC, 0.25 OR 0.5 MG/DOSE, SC) Inject 1 mg into the skin once a week       Estradiol (VAGIFEM) 10 MCG TABS vaginal tablet Place 10 mcg vaginally Twice a Week      docusate sodium (STOOL SOFTENER) 100 MG capsule Take 100 mg by mouth nightly      cyanocobalamin 1000 MCG/ML injection Inject 1,000 mcg into the muscle every 30 days       Cranberry 500 MG CAPS Take 500 mg by mouth daily       Alpha-Lipoic Acid 600 MG CAPS Take 600 mg by mouth daily       polyethylene glycol (GLYCOLAX) packet Take 17 g by mouth daily       Multiple Vitamins-Minerals (CENTRUM SILVER ADULT 50+ PO) Take by mouth daily       oxybutynin (DITROPAN) 5 MG tablet Take 10 mg by mouth 2 times daily Take two in the morning and two at night       ammonium lactate (LAC-HYDRIN) 12 % lotion Apply  topically daily. Apply topically as needed.  niacin 500 MG tablet Take 500 mg by mouth daily (with breakfast)       losartan (COZAAR) 50 MG tablet Take 50 mg by mouth daily       fluticasone (FLONASE) 50 MCG/ACT nasal spray 2 sprays by Nasal route daily as needed       Insulin Lispro, Human, (HUMALOG PEN SC) Inject  into the skin.  SLIDING SCALE BASE    120-180 27units in am and 33 units at hs      atorvastatin (LIPITOR) 40 MG tablet Take 40 mg by mouth every evening       ropinirole (REQUIP) 1 MG tablet Take 1 mg by mouth 2 times daily       calcium carbonate (OSCAL) 500 MG TABS tablet Take 600 mg by mouth daily       insulin detemir (LEVEMIR) 100 UNIT/ML injection Inject 22 Units into the skin 2 times daily        No current facility-administered medications for this visit. Physical Exam:  BP (!) 98/58   Pulse 65   Resp 18   Ht 5' 6\" (1.676 m)   Wt 244 lb 6.4 oz (110.9 kg)   BMI 39.45 kg/m²   Wt Readings from Last 3 Encounters:   12/09/20 244 lb 6.4 oz (110.9 kg)   06/01/20 240 lb 8 oz (109.1 kg)   01/02/20 245 lb (111.1 kg)     Appearance: Awake, alert and oriented x 3, no acute respiratory distress  Skin: Intact, no rash  Head: Normocephalic, atraumatic  Eyes: EOMI, no conjunctival erythema  ENMT: No pharyngeal erythema, MMM, no rhinorrhea  Neck: Supple, no carotid bruits  Lungs: Clear to auscultation bilaterally. No wheezes, rales, or rhonchi.   Cardiac: Regular rate and rhythm, +S1S2, no murmurs apparent  Abdomen: Soft, nontender, +bowel sounds  Extremities: Moves all extremities x 4, no lower extremity edema  Neurologic: No focal motor deficits apparent, normal mood and affect, alert and oriented x 3    Laboratory Tests:  Lab Results   Component Value Date    CREATININE 1.1 (H) 12/17/2019    BUN 16 12/17/2019     12/17/2019    K 3.9 12/17/2019     (H) 12/17/2019    CO2 17 (L) 12/17/2019     Lab Results   Component Value Date    MG 1.7 12/17/2019     Lab Results   Component Value Date    WBC 6.3 12/17/2019    HGB 11.0 (L) 12/17/2019    HCT 37.1 12/17/2019    MCV 88.1 12/17/2019     12/17/2019     Lab Results   Component Value Date    ALT 17 12/16/2019    AST 51 (H) 12/16/2019    ALKPHOS 51 12/16/2019    BILITOT 0.4 12/16/2019     Lab Results   Component Value Date    CKTOTAL 71 09/04/2011    CKMB 2.5 09/04/2011    TROPONINI <0.01 12/14/2019 TROPONINI <0.01 12/13/2019    TROPONINI <0.01 12/13/2019     Lab Results   Component Value Date    INR 1.1 12/13/2019    INR 1.0 12/13/2019    INR 1.4 09/03/2011    PROTIME 12.1 12/13/2019    PROTIME 11.0 12/13/2019    PROTIME 13.7 (H) 09/03/2011     Lab Results   Component Value Date    TSH 1.670 12/13/2019     No results found for: LABA1C  No results found for: EAG  Lab Results   Component Value Date    CHOL 130 12/14/2019     Lab Results   Component Value Date    TRIG 174 (H) 12/14/2019     Lab Results   Component Value Date    HDL 71 12/14/2019     Lab Results   Component Value Date    LDLCALC 24 12/14/2019     Lab Results   Component Value Date    LABVLDL 35 12/14/2019     No results found for: CHOLHDLRATIO  No results for input(s): PROBNP in the last 72 hours. Cardiac Tests:  ECG: SR, rate 65, 1st degree AV block, RBBB/LAFB    Echocardiogram: 12/15/19 (Dr. Dakota Alexander)   Left ventricle is normal in size. Mild concentric left ventricular hypertrophy. No regional wall motion abnormalities seen. Ejection fraction is visually estimated at 55-60%. There is doppler evidence of stage I diastolic dysfunction. Normal right ventricular size and function. Moderate posterior mitral annular calcification. Focal calcification   mitral valve leaflets. Mild mitral regurgitation. Aortic root is sclerotic and calcified. Rika Bodo nuclear stress test: 12/15/19  1. No reversible perfusion defect   2. Ejection fraction is 62 %. 3. No significant wall motion abnormality     Cardiac catheterization: 12/16/19 (Dr. Alana Pride)  1. Severe proximal and mid LAD stenosis, status post successful PCI using a drug-eluting stent. 2.  Moderate proximal to mid left circumflex stenosis and moderate ostial PDA stenosis. 3.  Elevated left ventricular end-diastolic pressure. ASSESSMENT / PLAN:  1. CAD s/p PCI to LAD on 12/16/19  2. Chest pain -- no further episodes s/p PCI to LAD  3.  HTN -- BP today 98/58 (BP at last office visit 138/72)  4. DM -- on insulin  5. HLD -- on statin  6. BMI 39.5  7. Chronic RBBB/LAFB  8. CKD  9. H/o kidney stones  10. AUSTIN (AHI 57 in 5/2020) -- compliant with BiPAP  11. [facility] of CAD  12. History of left rotator cuff repair  12. S/p thyroidectomy -- on thyroid replacement  13. History of sinus pauses while hospitalized in 12/2019 (on BB at the time, +AUSTIN -- BB stopped during hospitalization)  14.  Preoperative cardiac evaluation (urological procedure)    - Stop brilinta  - Continue current medications otherwise (including ASA, statin, and ARB; BB previously stopped as outlined above)  - Aggressive risk factor modifications  - Treatment of AUSTNI  - She may proceed with urological procedure from a cardiology standpoint / continue ASA    Veronica Barrera MD  [facility]) Cardiology

## 2020-12-14 ENCOUNTER — PREP FOR PROCEDURE (OUTPATIENT)
Dept: UROLOGY | Age: 70
End: 2020-12-14

## 2020-12-14 ENCOUNTER — HOSPITAL ENCOUNTER (OUTPATIENT)
Age: 70
Discharge: HOME | End: 2020-12-16
Payer: MEDICARE

## 2020-12-14 PROCEDURE — U0003 INFECTIOUS AGENT DETECTION BY NUCLEIC ACID (DNA OR RNA); SEVERE ACUTE RESPIRATORY SYNDROME CORONAVIRUS 2 (SARS-COV-2) (CORONAVIRUS DISEASE [COVID-19]), AMPLIFIED PROBE TECHNIQUE, MAKING USE OF HIGH THROUGHPUT TECHNOLOGIES AS DESCRIBED BY CMS-2020-01-R: HCPCS

## 2020-12-14 RX ORDER — SODIUM CHLORIDE 0.9 % (FLUSH) 0.9 %
10 SYRINGE (ML) INJECTION PRN
Status: CANCELLED | OUTPATIENT
Start: 2020-12-14

## 2020-12-14 RX ORDER — SODIUM CHLORIDE 0.9 % (FLUSH) 0.9 %
10 SYRINGE (ML) INJECTION EVERY 12 HOURS SCHEDULED
Status: CANCELLED | OUTPATIENT
Start: 2020-12-14

## 2020-12-15 NOTE — PROGRESS NOTES
Have you been tested for COVID  Yes      12/14/2020 preop test     Have you been told you were positive for COVID No  Have you had any known exposure to someone that is positive for COVID No  Do you have a cough                   No              Do you have shortness of breath No                 Do you have a sore throat            No                Are you having chills                    No                Are you having muscle aches. No                    Please come to the hospital wearing a mask and have your significant other wear a mask as well. Both of you should check your temperature before leaving to come here,  if it is 100 or higher please call [phone] for instruction.

## 2020-12-15 NOTE — PROGRESS NOTES
[facility] PRE-ADMISSION TESTING INSTRUCTIONS    The Preadmission Testing patient is instructed accordingly using the following criteria (check applicable):    ARRIVAL INSTRUCTIONS:  [x] Parking the day of Surgery is located in the Main Entrance lot. Upon entering the door, make an immediate right to the surgery reception desk    [x] Bring photo ID and insurance card    [x] Bring in a copy of Living will or Durable Power of  papers. [x] Please be sure to arrange for responsible adult to provide transportation to and from the hospital    [x] Please arrange for responsible adult to be with you for the 24 hour period post procedure due to having anesthesia      GENERAL INSTRUCTIONS:    [x] Nothing by mouth after midnight, including gum, candy, mints or water    [x] You may brush your teeth, but do not swallow any water    [x] Take medications as instructed with 1-2 oz of water    [x] Stop herbal supplements and vitamins 5 days prior to procedure    [x] Follow preop dosing of blood thinners per physician instructions    [x] Take 1/2 dose of evening insulin, but no insulin after midnight    [x] No oral diabetic medications after midnight    [x] If diabetic and have low blood sugar or feel symptomatic, take 1-2oz apple juice only    [] Bring inhalers day of surgery    [] Bring C-PAP/ Bi-Pap day of surgery    [] Bring urine specimen day of surgery    [x] Shower or bath with soap, lather and rinse well, AM of Surgery, no lotion, powders or creams to surgical site    [] Follow bowel prep as instructed per surgeon    [x] No tobacco products within 24 hours of surgery     [x] No alcohol or illegal drug use within 24 hours of surgery.     [x] Jewelry, body piercing's, eyeglasses, contact lenses and dentures are not permitted into surgery (bring cases)      [x] Please do not wear any nail polish, make up or hair products on the day of surgery    [x] You can expect a call the business day prior to procedure to notify you if your arrival time changes    [x] If you receive a survey after surgery we would greatly appreciate your comments    [] Parent/guardian of a minor must accompany their child and remain on the premises  the entire time they are under our care     [] Pediatric patients may bring favorite toy, blanket or comfort item with them    [] A caregiver or family member must remain with the patient during their stay if they are mentally handicapped, have dementia, disoriented or unable to use a call light or would be a safety concern if left unattended    [x] Please notify surgeon if you develop any illness between now and time of surgery (cold, cough, sore throat, fever, nausea, vomiting) or any signs of infections  including skin, wounds, and dental.    [x]  The Outpatient Pharmacy is available to fill your prescription here on your day of surgery, ask your preop nurse for details    [] Other instructions    EDUCATIONAL MATERIALS PROVIDED:    [] PAT Preoperative Education Packet/Booklet     [] Medication List    [] Transfusion bracelet applied with instructions    [] Shower with soap, lather and rinse well, and use CHG wipes provided the evening before surgery as instructed    [] Incentive spirometer with instructions

## 2020-12-16 LAB
SARS-COV-2: NOT DETECTED
SOURCE: NORMAL

## 2020-12-17 ENCOUNTER — ANESTHESIA EVENT (OUTPATIENT)
Dept: OPERATING ROOM | Age: 70
End: 2020-12-17
Payer: MEDICARE

## 2020-12-18 ENCOUNTER — APPOINTMENT (OUTPATIENT)
Dept: GENERAL RADIOLOGY | Age: 70
End: 2020-12-18
Attending: UROLOGY
Payer: MEDICARE

## 2020-12-18 ENCOUNTER — ANESTHESIA (OUTPATIENT)
Dept: OPERATING ROOM | Age: 70
End: 2020-12-18
Payer: MEDICARE

## 2020-12-18 ENCOUNTER — HOSPITAL ENCOUNTER (OUTPATIENT)
Age: 70
Setting detail: OUTPATIENT SURGERY
Discharge: HOME | End: 2020-12-18
Attending: UROLOGY | Admitting: UROLOGY
Payer: MEDICARE

## 2020-12-18 VITALS
TEMPERATURE: 97 F | HEART RATE: 64 BPM | BODY MASS INDEX: 39.05 KG/M2 | SYSTOLIC BLOOD PRESSURE: 153 MMHG | RESPIRATION RATE: 18 BRPM | WEIGHT: 243 LBS | OXYGEN SATURATION: 100 % | HEIGHT: 66 IN | DIASTOLIC BLOOD PRESSURE: 60 MMHG

## 2020-12-18 VITALS
RESPIRATION RATE: 19 BRPM | OXYGEN SATURATION: 90 % | SYSTOLIC BLOOD PRESSURE: 176 MMHG | DIASTOLIC BLOOD PRESSURE: 84 MMHG

## 2020-12-18 LAB — METER GLUCOSE: 115 MG/DL (ref 74–99)

## 2020-12-18 PROCEDURE — 7100000010 HC PHASE II RECOVERY - FIRST 15 MIN: Performed by: UROLOGY

## 2020-12-18 PROCEDURE — C1897 LEAD, NEUROSTIM TEST KIT: HCPCS | Performed by: UROLOGY

## 2020-12-18 PROCEDURE — 3600000003 HC SURGERY LEVEL 3 BASE: Performed by: UROLOGY

## 2020-12-18 PROCEDURE — 2709999900 HC NON-CHARGEABLE SUPPLY: Performed by: UROLOGY

## 2020-12-18 PROCEDURE — 2580000003 HC RX 258

## 2020-12-18 PROCEDURE — 6360000002 HC RX W HCPCS

## 2020-12-18 PROCEDURE — 6360000002 HC RX W HCPCS: Performed by: UROLOGY

## 2020-12-18 PROCEDURE — 6360000002 HC RX W HCPCS: Performed by: NURSE PRACTITIONER

## 2020-12-18 PROCEDURE — 3209999900 FLUORO FOR SURGICAL PROCEDURES

## 2020-12-18 PROCEDURE — 2500000003 HC RX 250 WO HCPCS: Performed by: UROLOGY

## 2020-12-18 PROCEDURE — 2720000002 HC MISC SURG SUPPLY STERILE >$500: Performed by: UROLOGY

## 2020-12-18 PROCEDURE — 3700000000 HC ANESTHESIA ATTENDED CARE: Performed by: UROLOGY

## 2020-12-18 PROCEDURE — 7100000011 HC PHASE II RECOVERY - ADDTL 15 MIN: Performed by: UROLOGY

## 2020-12-18 PROCEDURE — 3600000013 HC SURGERY LEVEL 3 ADDTL 15MIN: Performed by: UROLOGY

## 2020-12-18 PROCEDURE — 82962 GLUCOSE BLOOD TEST: CPT

## 2020-12-18 PROCEDURE — 3700000001 HC ADD 15 MINUTES (ANESTHESIA): Performed by: UROLOGY

## 2020-12-18 RX ORDER — SODIUM CHLORIDE 0.9 % (FLUSH) 0.9 %
10 SYRINGE (ML) INJECTION EVERY 12 HOURS SCHEDULED
Status: DISCONTINUED | OUTPATIENT
Start: 2020-12-18 | End: 2020-12-18 | Stop reason: HOSPADM

## 2020-12-18 RX ORDER — SODIUM CHLORIDE 0.9 % (FLUSH) 0.9 %
10 SYRINGE (ML) INJECTION PRN
Status: DISCONTINUED | OUTPATIENT
Start: 2020-12-18 | End: 2020-12-18 | Stop reason: HOSPADM

## 2020-12-18 RX ORDER — MEPERIDINE HYDROCHLORIDE 25 MG/ML
12.5 INJECTION INTRAMUSCULAR; INTRAVENOUS; SUBCUTANEOUS EVERY 5 MIN PRN
Status: DISCONTINUED | OUTPATIENT
Start: 2020-12-18 | End: 2020-12-18 | Stop reason: HOSPADM

## 2020-12-18 RX ORDER — KETOROLAC TROMETHAMINE 10 MG/1
10 TABLET, FILM COATED ORAL EVERY 6 HOURS PRN
Qty: 20 TABLET | Refills: 0 | Status: SHIPPED | OUTPATIENT
Start: 2020-12-18 | End: 2021-12-18

## 2020-12-18 RX ORDER — MIDAZOLAM HYDROCHLORIDE 1 MG/ML
INJECTION INTRAMUSCULAR; INTRAVENOUS PRN
Status: DISCONTINUED | OUTPATIENT
Start: 2020-12-18 | End: 2020-12-18 | Stop reason: SDUPTHER

## 2020-12-18 RX ORDER — FENTANYL CITRATE 50 UG/ML
INJECTION, SOLUTION INTRAMUSCULAR; INTRAVENOUS PRN
Status: DISCONTINUED | OUTPATIENT
Start: 2020-12-18 | End: 2020-12-18 | Stop reason: SDUPTHER

## 2020-12-18 RX ORDER — ONDANSETRON 2 MG/ML
4 INJECTION INTRAMUSCULAR; INTRAVENOUS
Status: DISCONTINUED | OUTPATIENT
Start: 2020-12-18 | End: 2020-12-18 | Stop reason: HOSPADM

## 2020-12-18 RX ORDER — CEFAZOLIN SODIUM 1 G/3ML
INJECTION, POWDER, FOR SOLUTION INTRAMUSCULAR; INTRAVENOUS PRN
Status: DISCONTINUED | OUTPATIENT
Start: 2020-12-18 | End: 2020-12-18 | Stop reason: ALTCHOICE

## 2020-12-18 RX ORDER — CEPHALEXIN 500 MG/1
500 CAPSULE ORAL 2 TIMES DAILY
Qty: 10 CAPSULE | Refills: 0 | Status: SHIPPED | OUTPATIENT
Start: 2020-12-18 | End: 2020-12-23

## 2020-12-18 RX ORDER — PROPOFOL 10 MG/ML
INJECTION, EMULSION INTRAVENOUS CONTINUOUS PRN
Status: DISCONTINUED | OUTPATIENT
Start: 2020-12-18 | End: 2020-12-18 | Stop reason: SDUPTHER

## 2020-12-18 RX ORDER — SODIUM CHLORIDE 9 MG/ML
INJECTION, SOLUTION INTRAVENOUS CONTINUOUS PRN
Status: DISCONTINUED | OUTPATIENT
Start: 2020-12-18 | End: 2020-12-18 | Stop reason: SDUPTHER

## 2020-12-18 RX ORDER — BUPIVACAINE HYDROCHLORIDE 5 MG/ML
INJECTION, SOLUTION EPIDURAL; INTRACAUDAL PRN
Status: DISCONTINUED | OUTPATIENT
Start: 2020-12-18 | End: 2020-12-18 | Stop reason: ALTCHOICE

## 2020-12-18 RX ADMIN — FENTANYL CITRATE 20 MCG: 50 INJECTION, SOLUTION INTRAMUSCULAR; INTRAVENOUS at 09:16

## 2020-12-18 RX ADMIN — MIDAZOLAM 1 MG: 1 INJECTION INTRAMUSCULAR; INTRAVENOUS at 08:22

## 2020-12-18 RX ADMIN — SODIUM CHLORIDE: 9 INJECTION, SOLUTION INTRAVENOUS at 07:55

## 2020-12-18 RX ADMIN — FENTANYL CITRATE 20 MCG: 50 INJECTION, SOLUTION INTRAMUSCULAR; INTRAVENOUS at 09:17

## 2020-12-18 RX ADMIN — MIDAZOLAM 1 MG: 1 INJECTION INTRAMUSCULAR; INTRAVENOUS at 08:29

## 2020-12-18 RX ADMIN — FENTANYL CITRATE 20 MCG: 50 INJECTION, SOLUTION INTRAMUSCULAR; INTRAVENOUS at 08:48

## 2020-12-18 RX ADMIN — FENTANYL CITRATE 20 MCG: 50 INJECTION, SOLUTION INTRAMUSCULAR; INTRAVENOUS at 08:54

## 2020-12-18 RX ADMIN — PROPOFOL 50 MCG/KG/MIN: 10 INJECTION, EMULSION INTRAVENOUS at 08:39

## 2020-12-18 RX ADMIN — Medication 1.5 G: at 07:30

## 2020-12-18 ASSESSMENT — PULMONARY FUNCTION TESTS
PIF_VALUE: 1
PIF_VALUE: 0
PIF_VALUE: 1
PIF_VALUE: 1
PIF_VALUE: 2
PIF_VALUE: 1
PIF_VALUE: 1
PIF_VALUE: 0
PIF_VALUE: 1
PIF_VALUE: 0
PIF_VALUE: 1
PIF_VALUE: 0
PIF_VALUE: 1
PIF_VALUE: 0
PIF_VALUE: 1
PIF_VALUE: 0
PIF_VALUE: 1
PIF_VALUE: 0
PIF_VALUE: 0
PIF_VALUE: 1
PIF_VALUE: 0
PIF_VALUE: 0
PIF_VALUE: 1
PIF_VALUE: 0
PIF_VALUE: 1
PIF_VALUE: 0
PIF_VALUE: 1
PIF_VALUE: 1
PIF_VALUE: 2
PIF_VALUE: 1

## 2020-12-18 ASSESSMENT — PAIN SCALES - GENERAL
PAINLEVEL_OUTOF10: 0

## 2020-12-18 ASSESSMENT — ENCOUNTER SYMPTOMS: DYSPNEA ACTIVITY LEVEL: AFTER AMBULATING 1 FLIGHT OF STAIRS

## 2020-12-18 ASSESSMENT — PAIN - FUNCTIONAL ASSESSMENT: PAIN_FUNCTIONAL_ASSESSMENT: 0-10

## 2020-12-18 NOTE — ANESTHESIA PRE PROCEDURE
Department of Anesthesiology  Preprocedure Note       Name:  Teresa Kuhn   Age:  69 y.o.  :  1950                                          MRN:  [id number]         Date:  2020      Surgeon: Yeyo Callahan):  Johnathan Olmedo,     Procedure: Procedure(s):  REVISION OF INTERSTIM   ++IODINE ALLERGY++    Medications prior to admission:   Prior to Admission medications    Medication Sig Start Date End Date Taking?  Authorizing Provider   TOUJEO SOLOSTAR 300 UNIT/ML SOPN 22 Units 2 times daily Lunch and dinner  Take 1/2 dose 2020 pm 19  Yes Historical Provider, MD   Potassium Citrate ER 15 MEQ (1620 MG) TBCR Take 1 tablet by mouth daily  19  Yes Historical Provider, MD   aspirin 81 MG EC tablet Take 1 tablet by mouth daily 19  Yes Monica Cantrell MD   vitamin D (CHOLECALCIFEROL) 125 MCG (5000 UT) CAPS capsule Take 5,000 Units by mouth daily Ld 12/15   Yes Historical Provider, MD   metFORMIN (GLUCOPHAGE-XR) 500 MG extended release tablet Take 1,000 mg by mouth 2 times daily (with meals)   Yes Historical Provider, MD   cephALEXin (KEFLEX) 250 MG capsule Take 250 mg by mouth Twice a Week   Yes Historical Provider, MD   levothyroxine (SYNTHROID) 125 MCG tablet Take 125 mcg by mouth Daily    Yes Historical Provider, MD   Semaglutide (OZEMPIC, 0.25 OR 0.5 MG/DOSE, SC) Inject 1 mg into the skin once a week Ld    Yes Historical Provider, MD   Estradiol (VAGIFEM) 10 MCG TABS vaginal tablet Place 10 mcg vaginally Twice a Week   Yes Historical Provider, MD   docusate sodium (STOOL SOFTENER) 100 MG capsule Take 300 mg by mouth nightly    Yes Historical Provider, MD   cyanocobalamin 1000 MCG/ML injection Inject 1,000 mcg into the muscle every 30 days    Yes Historical Provider, MD   Cranberry 500 MG CAPS Take 500 mg by mouth daily Ld    Yes Historical Provider, MD   Alpha-Lipoic Acid 600 MG CAPS Take 600 mg by mouth daily Ld    Yes Historical Provider, MD polyethylene glycol (GLYCOLAX) packet Take 17 g by mouth daily    Yes Historical Provider, MD   Multiple Vitamins-Minerals (CENTRUM SILVER ADULT 50+ PO) Take 1 tablet by mouth daily Ld 12/15   Yes Historical Provider, MD   oxybutynin (DITROPAN) 5 MG tablet Take 10 mg by mouth 2 times daily Take two in the morning and two at night    Yes Historical Provider, MD   ammonium lactate (LAC-HYDRIN) 12 % lotion Apply topically daily Apply topically as needed. Uses on heels daily   Yes Historical Provider, MD   niacin 500 MG tablet Take 500 mg by mouth daily (with breakfast) Ld 12/14   Yes Historical Provider, MD   losartan (COZAAR) 50 MG tablet Take 50 mg by mouth daily    Yes Historical Provider, MD   Insulin Lispro, Human, (HUMALOG PEN SC) Inject into the skin SLIDING SCALE BASE    120-180 27units in am and 33 units at dinner   Yes Historical Provider, MD   atorvastatin (LIPITOR) 40 MG tablet Take 40 mg by mouth every evening    Yes Historical Provider, MD   ropinirole (REQUIP) 1 MG tablet Take 2 mg by mouth nightly    Yes Historical Provider, MD       Current medications:    Current Facility-Administered Medications   Medication Dose Route Frequency Provider Last Rate Last Admin    sodium chloride flush 0.9 % injection 10 mL  10 mL Intravenous 2 times per day AMALIA Riley CNP        sodium chloride flush 0.9 % injection 10 mL  10 mL Intravenous PRN AMALIA Riley CNP        vancomycin 1.5 g in dextrose 5% 300 mL IVPB  1,500 mg Intravenous On Call to [facility], APRN - CNP           Allergies:     Allergies   Allergen Reactions    Dye [Iodides] Other (See Comments)     Renal failure      Levaquin [Levofloxacin]     Adhesive Tape Rash    Penicillins Swelling and Rash    Sulfa Antibiotics Swelling and Rash       Problem List:    Patient Active Problem List   Diagnosis Code    HTN (hypertension), benign I10    Diabetes mellitus type 2, uncontrolled ([facility] [address] E11.65  AUSTIN (obstructive sleep apnea) G47.33    NSTEMI (non-ST elevation myocardial infarction) ([facility]) I21.4    Chest pain R07.9    Morbid obesity with BMI of 40.0-44.9, adult ([facility]) E66.01, Z68.41    Hyperlipidemia LDL goal <100 E78.5    CKD (chronic kidney disease) stage 3, GFR 30-59 ml/min N18.30       Past Medical History:        Diagnosis Date    Arthritis     CAD (coronary artery disease)     follows with Dr Rafy Noel    Chronic constipation     Diabetes mellitus ([facility] [address]     STABLE PER PT    Hyperlipidemia     Hypertension     STABLE PER PT    Kidney stones     Lip lesion     uses dermatology creas for precancer of lip & section of face near eyes    AUSTIN treated with BiPAP     Prolonged emergence from general anesthesia     Restless leg syndrome     Urinary incontinence        Past Surgical History:        Procedure Laterality Date    CARDIAC CATHETERIZATION  12/16/2019    Dr. Ursula Burr- Resolute [place] PERRY LAD 3.0 x 26    CARPAL TUNNEL RELEASE      bilateral    CATARACT REMOVAL WITH IMPLANT Left 02/07/2017    CATARACT REMOVAL WITH IMPLANT Right 04/06/2017    COLONOSCOPY      CYSTOSCOPY  03/02/2012    retrograde and cultures    CYSTOSCOPY  03/14/2012    cysto, laser, litho, stent placement    CYSTOSCOPY  4/22/2014    laser litho, stent insertion    FRACTURE SURGERY      orif left foot    HAMMER TOE SURGERY Left     HERNIA REPAIR      umbilical hernia    HYSTERECTOMY      PARTIAL    KIDNEY STONE SURGERY      LIPOMA RESECTION       RIGHTCALF AND UPPER LEFT ARM    OTHER SURGICAL HISTORY N/A 10/24/14    EXPLANT OF UROTSTIM IMPLANT OF PERIPHERAL NEROSTIM PULSE GENERATOR    ROTATOR CUFF REPAIR      left       Social History:    Social History     Tobacco Use    Smoking status: Never Smoker    Smokeless tobacco: Never Used   Substance Use Topics    Alcohol use:  No                                Counseling given: Not Answered      Vital Signs (Current):   Vitals:    12/15/20 1436 12/18/20 0636 LEFT MAIN:  It is a large artery with no angiographic stenosis noted. LAD:  It is a large artery giving rise to a diagonal branch and a fair  size septal . The LAD was heavily calcified from proximal to  mid segment. There was 70% eccentric discrete proximal stenosis at the  takeoff of the septal . There was 90% discrete mid LAD  stenosis. LEFT CIRCUMFLEX:  It is a large dominant artery giving rise to a very  high takeoff obtuse marginal branch which is fair in size and  bifurcating. It also gives rise to a small second obtuse marginal  branch, a large PDA, and a PLV branch. There was 30% to 40% discrete  proximal OM1 stenosis. There was 40% to 50% tubular proximal to mid  circumflex stenosis. There was 60% discrete ostial proximal PDA  stenosis. RCA:  It is a small nondominant artery giving rise to a conus branch, an  RV marginal branch. There was 40% to 50% discrete proximal stenosis.     CORONARY INTERVENTION NOTE:  The patient was started on Angiomax bolus  infusion. She received two tablets of Brilinta. Then, a 6-Georgian EBU  3.5 guiding catheter was used to engage the left main coronary artery. Then, a BMW wire failed to be advanced to the LAD. Then, a Runthrough  wire was advanced through the proximal and mid LAD stenosis without  difficulty. Then, 2.5 x 15 Badger balloon was inflated several times at  10 to 12 atmospheres at proximal and mid LAD. Then, 3.0 x 26 Resolute  [place] stent was deployed at nominal pressure of 12 atmospheres. Then, a  3.5 x 15 NC Quantum balloon was inflated three times inside the stent at  nominal pressure of 12 atmospheres with good final angiographic result. At the end of the procedure, the Angiomax infusion was discontinued. The wire and the guiding catheter were pulled out. The Terumo Slender  sheath was pulled out, and a Vasc band was applied over the right radial  artery with good hemostasis.   The patient tolerated the procedure very well and no complications noted. No significant blood loss occurred  during the procedure.     IMPRESSION:  1. Severe proximal and mid LAD stenosis, status post successful PCI  using a drug-eluting stent. 2.  Moderate proximal to mid left circumflex stenosis and moderate  ostial PDA stenosis. 3.  Elevated left ventricular end-diastolic pressure.     RECOMMENDATION:  Aggressive medical therapy.     PROCEDURE SEDATION START TIME:  1327.     PROCEDURE END TIME:  1433.     FLUOROSCOPY TIME:  15.5 minutes.     CONTRAST VOLUME:  160 mL of Optiray.     CONSCIOUS SEDATION:  Consisting of 50 mcg of intravenous fentanyl.           Keiry Garza MD     D: 2019 15:00:19       T: 2019 16:22:14     [id number]  Job#: [id number]     Doc#: [id number]     CC:         Display only: Transcription ([id number]) on 2019  3:00 PM by Kamala Vee MD       Narrative   Patient MRN:  [id number]   : 1950   Age: 78 years   Gender: Female   Order Date:  12/15/2019 12:00 AM   EXAM: NM MYOCARDIAL SPECT REST EXERCISE OR RX   Number of Images: 9 views   INDICATION:  Chest pain    Reason for Exam?->Chest pain   Procedure Type->Rx    COMPARISON: None       TECHNIQUE:   11.7 mCi of Tc-99m MIBI was injected intravenously at rest and cardiac   SPECT images were performed. In addition 35 mCi of Tc-99m MIBI was   injected intravenously at maximum stress by using Lexiscan stress. Stress SPECT images and gated study were performed.        FINDINGS:   Perfusion images demonstrate no reversible perfusion defect. Wall motion is within normal limits. The end diastolic volume is 99 ml. The end systolic volume is 38 ml. The estimated ejection fraction is 62 %.            Impression   1. No reversible perfusion defect   2. Ejection fraction is 62 %.    3. No significant wall motion abnormality         Anesthesia Evaluation  Patient summary reviewed and Nursing notes reviewed history of anesthetic complications: Airway: Mallampati: III  TM distance: >3 FB   Neck ROM: full  Mouth opening: > = 3 FB Dental:      Comment: Grossly intact    Pulmonary: breath sounds clear to auscultation  (+) sleep apnea: on CPAP,                            ROS comment: Patient denies shortness of breath   Cardiovascular:  Exercise tolerance: poor (<4 METS),   (+) hypertension: moderate, past MI: > 6 months, CAD: no interval change, CABG/stent:, AVALOS: after ambulating 1 flight of stairs, hyperlipidemia      NYHA Classification: II  ECG reviewed  Rhythm: regular  Rate: normal  Echocardiogram reviewed  Stress test reviewed  Cleared by cardiology     Beta Blocker:  Not on Beta Blocker      ROS comment: Stent to LAD 12/2019     Neuro/Psych:   (+) neuromuscular disease:, depression/anxiety              ROS comment: Restless leg syndrome GI/Hepatic/Renal:   (+) renal disease: CRI, morbid obesity          Endo/Other:    (+) DiabetesType II DM, well controlled, using insulin, hypothyroidism, blood dyscrasia: anticoagulation therapy, arthritis:., .                  ROS comment: Discontinued Brilinta one week ago Abdominal:   (+) obese,     Abdomen: soft. Vascular: negative vascular ROS. Anesthesia Plan      MAC     ASA 3       Induction: intravenous. MIPS: Postoperative opioids intended and Prophylactic antiemetics administered. Anesthetic plan and risks discussed with patient and child/children. Use of blood products discussed with patient whom consented to blood products. Plan discussed with CRNA and attending. Lilia Basurto RN   12/18/2020    DOS STAFF ADDENDUM:    Pt seen and examined, chart reviewed (including anesthesia, drug and allergy history). Anesthetic plan, risks, benefits, alternatives, and personnel involved discussed with patient. Patient verbalized an understanding and agrees to proceed. Plan discussed with care team members and agreed upon.     Otilia Vaughn MD Staff Anesthesiologist  7:49 AM

## 2020-12-18 NOTE — H&P
12/18/2020 8:21 AM  Service: Urology  Group: [facility] ([place]/[facility])    Teresa Kuhn  [id number]     Chief Complaint: OAB    History of Present Illness:   The patient is a 69 y.o. female patient who presents with the above    Past Medical History:   Diagnosis Date    Arthritis     CAD (coronary artery disease)     follows with Dr Rafy Noel    Chronic constipation     Diabetes mellitus ([facility] [address]     STABLE PER PT    Hyperlipidemia     Hypertension     STABLE PER PT    Kidney stones     Lip lesion     uses dermatology creas for precancer of lip & section of face near eyes    AUSTIN treated with BiPAP     Prolonged emergence from general anesthesia     Restless leg syndrome     Urinary incontinence        Past Surgical History:   Procedure Laterality Date    CARDIAC CATHETERIZATION  12/16/2019    Dr. Ursula Burr- Resolute [place] PERRY LAD 3.0 x 26    CARPAL TUNNEL RELEASE      bilateral    CATARACT REMOVAL WITH IMPLANT Left 02/07/2017    CATARACT REMOVAL WITH IMPLANT Right 04/06/2017    COLONOSCOPY      CYSTOSCOPY  03/02/2012    retrograde and cultures    CYSTOSCOPY  03/14/2012    cysto, laser, litho, stent placement    CYSTOSCOPY  4/22/2014    laser litho, stent insertion    FRACTURE SURGERY      orif left foot    HAMMER TOE SURGERY Left     HERNIA REPAIR      umbilical hernia    HYSTERECTOMY      PARTIAL    KIDNEY STONE SURGERY      LIPOMA RESECTION       RIGHTCALF AND UPPER LEFT ARM    OTHER SURGICAL HISTORY N/A 10/24/14    EXPLANT OF UROTSTIM IMPLANT OF PERIPHERAL NEROSTIM PULSE GENERATOR    ROTATOR CUFF REPAIR      left       Medications Prior to Admission:    Medications Prior to Admission: TOUJEO SOLOSTAR 300 UNIT/ML SOPN, 22 Units 2 times daily Lunch and dinner Take 1/2 dose 12/17/2020 pm  Potassium Citrate ER 15 MEQ (1620 MG) TBCR, Take 1 tablet by mouth daily   aspirin 81 MG EC tablet, Take 1 tablet by mouth daily  vitamin D (CHOLECALCIFEROL) 125 MCG (5000 UT) CAPS capsule, Take 5,000 Units by mouth daily Ld 12/15  metFORMIN (GLUCOPHAGE-XR) 500 MG extended release tablet, Take 1,000 mg by mouth 2 times daily (with meals)  cephALEXin (KEFLEX) 250 MG capsule, Take 250 mg by mouth Twice a Week  levothyroxine (SYNTHROID) 125 MCG tablet, Take 125 mcg by mouth Daily   Semaglutide (OZEMPIC, 0.25 OR 0.5 MG/DOSE, SC), Inject 1 mg into the skin once a week Ld 12/13  Estradiol (VAGIFEM) 10 MCG TABS vaginal tablet, Place 10 mcg vaginally Twice a Week  docusate sodium (STOOL SOFTENER) 100 MG capsule, Take 300 mg by mouth nightly   cyanocobalamin 1000 MCG/ML injection, Inject 1,000 mcg into the muscle every 30 days   Cranberry 500 MG CAPS, Take 500 mg by mouth daily Ld 12/14  Alpha-Lipoic Acid 600 MG CAPS, Take 600 mg by mouth daily Ld 12/14  polyethylene glycol (GLYCOLAX) packet, Take 17 g by mouth daily   Multiple Vitamins-Minerals (CENTRUM SILVER ADULT 50+ PO), Take 1 tablet by mouth daily Ld 12/15  oxybutynin (DITROPAN) 5 MG tablet, Take 10 mg by mouth 2 times daily Take two in the morning and two at night   ammonium lactate (LAC-HYDRIN) 12 % lotion, Apply topically daily Apply topically as needed. Uses on heels daily  niacin 500 MG tablet, Take 500 mg by mouth daily (with breakfast) Ld 12/14  losartan (COZAAR) 50 MG tablet, Take 50 mg by mouth daily   Insulin Lispro, Human, (HUMALOG PEN SC), Inject into the skin SLIDING SCALE BASE  120-180 27units in am and 33 units at dinner  atorvastatin (LIPITOR) 40 MG tablet, Take 40 mg by mouth every evening   ropinirole (REQUIP) 1 MG tablet, Take 2 mg by mouth nightly     Allergies:    Dye [iodides], Levaquin [levofloxacin], Adhesive tape, Penicillins, and Sulfa antibiotics    Social History:    reports that she has never smoked. She has never used smokeless tobacco. She reports that she does not drink alcohol or use drugs. Family History:   Non-contributory to this urological problem  family history includes Heart Attack (age of onset: 48) in her father.     Review of Systems:  Respiratory: negative for cough and hemoptysis  Cardiovascular: negative for chest pain and dyspnea  Gastrointestinal: negative for abdominal pain, diarrhea, nausea and vomiting  Derm: negative for rash and skin lesion(s)  Neurological: negative for seizures and tremors  Endocrine: negative for diabetic symptoms including polydipsia and polyuria  : As above in the HPI, otherwise negative  All other reviews are negative    Physical Exam:   Vitals: BP (!) 151/73   Pulse 75   Temp 97.7 °F (36.5 °C) (Temporal)   Resp 18   Ht 5' 6\" (1.676 m)   Wt 243 lb (110.2 kg)   SpO2 97%   BMI 39.22 kg/m²   General:  Awake, alert, oriented X 3. Well developed, well nourished, well groomed. No apparent distress. HEENT:  Normocephalic, atraumatic. Pupils equal, round. No scleral icterus. No conjunctival injection. Normal lips, teeth, and gums. No nasal discharge. Neck:  Supple, no masses. Heart:  RRR  Lungs:  No audible wheezing. Respirations symmetric and non-labored. Abdomen:  soft, nontender, no masses, no organomegaly, no peritoneal signs  Extremities:  No clubbing, cyanosis, or edema  Skin:  Warm and dry, no open lesions or rashes  Neuro:  Cranial nerves 2-12 intact, no focal deficits  Rectal: deferred  Genitalia:  Aaron no    Labs:   No results for input(s): WBC, RBC, HGB, HCT, MCV, MCH, MCHC, RDW, PLT, MPV in the last 72 hours. No results for input(s): CREATININE in the last 72 hours.     Images:      Assessment: Yadira Ross 69 y.o. female     OAB    Plan:    See the outpatient H&P  All options were discussed  The patient family is present  Progress to the OR for interstim revision   The risks, benefits, and alternatives were discussed  NPO  DVT prophylaxis  Pre-op antibiotics      Waldo Grady Memo, DO   [facility]

## 2020-12-18 NOTE — ANESTHESIA POSTPROCEDURE EVALUATION
Department of Anesthesiology  Postprocedure Note    Patient: Martha Zuñiga  MRN: [id number]  YOB: 1950  Date of evaluation: 12/18/2020  Time:  10:48 AM     Procedure Summary     Date: 12/18/20 Room / Location: [place] [address] / [facility]    Anesthesia Start: 3489 Anesthesia Stop: 0667    Procedure: REVISION OF Karron Negus (N/A ) Diagnosis: (INCONTINENCE)    Surgeons: Jina Olmedo DO Responsible Provider: Nidia Tijerina MD    Anesthesia Type: MAC ASA Status: 3          Anesthesia Type: MAC    Bryce Phase I: Bryce Score: 10    Bryce Phase II: Bryce Score: 10    Last vitals: Reviewed and per EMR flowsheets.        Anesthesia Post Evaluation    Patient location during evaluation: PACU  Patient participation: complete - patient participated  Level of consciousness: awake and alert  Airway patency: patent  Nausea & Vomiting: no vomiting and no nausea  Complications: no  Cardiovascular status: hemodynamically stable  Respiratory status: acceptable  Hydration status: stable

## 2020-12-19 NOTE — OP NOTE
[id number] [address]                                OPERATIVE REPORT    PATIENT NAME: Chantelle Holman                      :        1950  MED REC NO:   [id number]                            ROOM:  ACCOUNT NO:   [de-identified]                           ADMIT DATE: 2020  PROVIDER:     [facility] Honorio, DO    DATE OF PROCEDURE:  2020    PREOPERATIVE DIAGNOSIS:  Malfunctioning InterStim. POSTOPERATIVE DIAGNOSIS:  Malfunctioning InterStim. PROCEDURES PERFORMED:  The patient had:  1. Removal of a previous percutaneous lead. 2.  Implant of a new percutaneous lead. 3.  Reconnection of the InterStim with the old InterStim battery. ANESTHESIA:  Monitored anesthesia as well as local infiltration. ESTIMATED BLOOD LOSS:  Minimal.    CONDITION:  To PACU, stable. Preoperative antibiotics were administered. PATHOLOGIC SPECIMEN:  None. STORY:  A 16-year-old female that in  had an InterStim done on the  right side. The patient did quite well until 2019, at which time she  underwent a battery revision to old lead, which was of about [de-identified] years  old was used, but the new lead was used, I think, and it was brought  over to the left side. Ultimately, the patient had done well until  about a month ago where it quit working. Battery life on the unit was  quite well. It was working quite well, but if for whatever reason, it  just stopped working. At this time, I did talk to her about doing a  revision. She understood the risks, benefits, and alternatives of the  InterStim revision and elected to proceed. DESCRIPTION OF PROCEDURE:  This 16-year-old female was brought to the  operating room #9 at [facility],  placed in the prone position, and induced with monitored anesthesia.    Anesthesia monitored the head and neck area, IV access, and vital signs will be reactivated in the PACU. She will be sent home with the same  previous handheld unit. There were no intraoperative complications. Lap count, instrument count, and needle count were correct.         [address] Arizmendi DO    D: 12/18/2020 9:46:52       T: 12/18/2020 10:29:34     [id number]  Job#: [id number]     Doc#: [id number]    CC:

## 2021-02-04 ENCOUNTER — IMMUNIZATION (OUTPATIENT)
Dept: PRIMARY CARE CLINIC | Age: 71
End: 2021-02-04
Payer: MEDICARE

## 2021-02-04 PROCEDURE — 0011A COVID-19, MODERNA VACCINE 100MCG/0.5ML DOSE: CPT | Performed by: PHYSICIAN ASSISTANT

## 2021-02-04 PROCEDURE — 91301 COVID-19, MODERNA VACCINE 100MCG/0.5ML DOSE: CPT | Performed by: PHYSICIAN ASSISTANT

## 2021-03-04 ENCOUNTER — IMMUNIZATION (OUTPATIENT)
Dept: PRIMARY CARE CLINIC | Age: 71
End: 2021-03-04
Payer: MEDICARE

## 2021-03-04 PROCEDURE — 0012A COVID-19, MODERNA VACCINE 100MCG/0.5ML DOSE: CPT | Performed by: PHYSICIAN ASSISTANT

## 2021-03-04 PROCEDURE — 91301 COVID-19, MODERNA VACCINE 100MCG/0.5ML DOSE: CPT | Performed by: PHYSICIAN ASSISTANT

## 2021-12-13 NOTE — PROGRESS NOTES
OUTPATIENT CARDIOLOGY FOLLOW-UP    Name: Cole Rizzo    Age: 66 y.o. Primary Care Physician: Ramírez Mckeon MD    Date of Service: 12/20/21    Chief Complaint: Follow-up for CAD s/p PCI    Interim History:  CAD s/p PCI in 12/2019. She denies recent chest pain, palpitations, syncope, or orthopnea. +long-standing intermittent SOB (not necessarily associated to exertion, not significant changes on/off brilinta, present prior to/after PCI, stable). No new cardiac complaints. SR on EKG.      Review of Systems:   Cardiac: As per HPI  General: No fever, chills  Pulmonary: As per HPI  HEENT: No visual disturbances, difficult swallowing  GI: No nausea, vomiting  : No dysuria, hematuria  Endocrine: +thyroid disorder, +DM  Musculoskeletal: HENRY x 4, no focal motor deficits  Skin: Intact, no rashes  Neuro: No headache, seizures  Psych: Currently with no depression, anxiety    Past Medical History:  Past Medical History:   Diagnosis Date    Arthritis     CAD (coronary artery disease)     follows with Dr Hernandez Ast    Chronic constipation     Diabetes mellitus ([facility] [address]     STABLE PER PT    Hyperlipidemia     Hypertension     STABLE PER PT    Kidney stones     Lip lesion     uses dermatology creas for precancer of lip & section of face near eyes    AUSTIN treated with BiPAP     Prolonged emergence from general anesthesia     Restless leg syndrome     Urinary incontinence        Past Surgical History:  Past Surgical History:   Procedure Laterality Date    CARDIAC CATHETERIZATION  12/16/2019    Dr. Deisi Garcia- [facility] PERRY LAD 3.0 x 26    CARPAL TUNNEL RELEASE      bilateral    CATARACT REMOVAL WITH IMPLANT Left 02/07/2017    CATARACT REMOVAL WITH IMPLANT Right 04/06/2017    COLONOSCOPY      CYSTOSCOPY  03/02/2012    retrograde and cultures    CYSTOSCOPY  03/14/2012    cysto, laser, litho, stent placement    CYSTOSCOPY  4/22/2014    laser litho, stent insertion    FRACTURE SURGERY      orif left foot    file   [id number] or Organizations: Not on file    Attends Club or Organization Meetings: Not on file    Marital Status: Not on file   Intimate Partner Violence:     Fear of Current or Ex-Partner: Not on file    Emotionally Abused: Not on file    Physically Abused: Not on file    Sexually Abused: Not on file   Housing Stability:     Unable to Pay for Housing in the Last Year: Not on file    Number of [place] in the Last Year: Not on file    Unstable Housing in the Last Year: Not on file       Allergies: Allergies   Allergen Reactions    Dye [Iodides] Other (See Comments)     Renal failure      Levaquin [Levofloxacin]     Adhesive Tape Rash    Penicillins Swelling and Rash    Sulfa Antibiotics Swelling and Rash       Current Medications:  Current Outpatient Medications   Medication Sig Dispense Refill    fluticasone (FLONASE) 50 MCG/ACT nasal spray Flonase Allergy Relief 50 mcg/actuation nasal spray,suspension   Spray 2 sprays as needed by intranasal route.       TOUJEO SOLOSTAR 300 UNIT/ML SOPN 22 Units 2 times daily Lunch and dinner  Take 1/2 dose 12/17/2020 pm      Potassium Citrate ER 15 MEQ (1620 MG) TBCR Take 1 tablet by mouth daily       aspirin 81 MG EC tablet Take 1 tablet by mouth daily 30 tablet 0    vitamin D (CHOLECALCIFEROL) 125 MCG (5000 UT) CAPS capsule Take 5,000 Units by mouth daily Ld 12/15      metFORMIN (GLUCOPHAGE-XR) 500 MG extended release tablet Take 1,000 mg by mouth 2 times daily (with meals)      cephALEXin (KEFLEX) 250 MG capsule Take 250 mg by mouth Twice a Week      Levothyroxine Sodium 137 MCG CAPS Take 137 mcg by mouth Daily       Semaglutide (OZEMPIC, 0.25 OR 0.5 MG/DOSE, SC) Inject 1 mg into the skin once a week Ld 12/13      Estradiol (VAGIFEM) 10 MCG TABS vaginal tablet Place 10 mcg vaginally Twice a Week      docusate sodium (STOOL SOFTENER) 100 MG capsule Take 300 mg by mouth nightly       cyanocobalamin 1000 MCG/ML injection Inject 1,000 mcg into the muscle every 30 days       Cranberry 500 MG CAPS Take 500 mg by mouth daily Ld 12/14      Alpha-Lipoic Acid 600 MG CAPS Take 600 mg by mouth daily Ld 12/14      polyethylene glycol (GLYCOLAX) packet Take 17 g by mouth daily       Multiple Vitamins-Minerals (CENTRUM SILVER ADULT 50+ PO) Take 1 tablet by mouth daily Ld 12/15      oxybutynin (DITROPAN) 5 MG tablet Take 10 mg by mouth 2 times daily Take two in the morning and two at night       ammonium lactate (LAC-HYDRIN) 12 % lotion Apply topically daily Apply topically as needed. Uses on heels daily      niacin 500 MG tablet Take 500 mg by mouth daily (with breakfast) Ld 12/14      losartan (COZAAR) 50 MG tablet Take 50 mg by mouth daily       Insulin Lispro, Human, (HUMALOG PEN SC) Inject into the skin SLIDING SCALE BASE    120-180 27units in am and 33 units at dinner      atorvastatin (LIPITOR) 40 MG tablet Take 40 mg by mouth every evening       rOPINIRole (REQUIP) 2 MG tablet Take 2 mg by mouth nightly       ketorolac (TORADOL) 10 MG tablet Take 1 tablet by mouth every 6 hours as needed for Pain 20 tablet 0     No current facility-administered medications for this visit. Physical Exam:  /78   Pulse 74   Resp 16   Ht 5' 6\" (1.676 m)   Wt 258 lb 11.2 oz (117.3 kg)   BMI 41.76 kg/m²   Wt Readings from Last 3 Encounters:   12/20/21 258 lb 11.2 oz (117.3 kg)   12/18/20 243 lb (110.2 kg)   12/09/20 244 lb 6.4 oz (110.9 kg)     Appearance: Awake, alert and oriented x 3, no acute respiratory distress  Skin: Intact, no rash  Head: Normocephalic, atraumatic  Eyes: EOMI, no conjunctival erythema  ENMT: No pharyngeal erythema, MMM, no rhinorrhea  Neck: Supple, no carotid bruits  Lungs: Clear to auscultation bilaterally. No wheezes, rales, or rhonchi.   Cardiac: Regular rate and rhythm, +S1S2, no murmurs apparent  Abdomen: Soft, nontender, +bowel sounds  Extremities: Moves all extremities x 4, no lower extremity edema  Neurologic: No focal motor deficits apparent, normal mood and affect, alert and oriented x 3    Laboratory Tests:  Lab Results   Component Value Date    CREATININE 1.1 (H) 12/17/2019    BUN 16 12/17/2019     12/17/2019    K 3.9 12/17/2019     (H) 12/17/2019    CO2 17 (L) 12/17/2019     CrCl cannot be calculated (Patient's most recent lab result is older than the maximum 120 days allowed. ). Lab Results   Component Value Date    MG 1.7 12/17/2019     Lab Results   Component Value Date    WBC 6.3 12/17/2019    HGB 11.0 (L) 12/17/2019    HCT 37.1 12/17/2019    MCV 88.1 12/17/2019     12/17/2019     Lab Results   Component Value Date    ALT 17 12/16/2019    AST 51 (H) 12/16/2019    ALKPHOS 51 12/16/2019    BILITOT 0.4 12/16/2019     Lab Results   Component Value Date    CKTOTAL 71 09/04/2011    CKMB 2.5 09/04/2011    TROPONINI <0.01 12/14/2019    TROPONINI <0.01 12/13/2019    TROPONINI <0.01 12/13/2019     Lab Results   Component Value Date    INR 1.1 12/13/2019    INR 1.0 12/13/2019    INR 1.4 09/03/2011    PROTIME 12.1 12/13/2019    PROTIME 11.0 12/13/2019    PROTIME 13.7 (H) 09/03/2011     Lab Results   Component Value Date    TSH 1.670 12/13/2019     No results found for: LABA1C  No results found for: EAG  Lab Results   Component Value Date    CHOL 130 12/14/2019     Lab Results   Component Value Date    TRIG 174 (H) 12/14/2019     Lab Results   Component Value Date    HDL 71 12/14/2019     Lab Results   Component Value Date    LDLCALC 24 12/14/2019     Lab Results   Component Value Date    LABVLDL 35 12/14/2019     No results found for: CHOLHDLRATIO  No results for input(s): PROBNP in the last 72 hours. Cardiac Tests:  ECG: SR, rate 74, 1st degree AV block, RBBB/LAFB    Echocardiogram: 12/15/19 (Dr. Alfreda Fraire)   Left ventricle is normal in size. Mild concentric left ventricular hypertrophy. No regional wall motion abnormalities seen.    Ejection fraction is visually estimated at 55-60%. There is doppler evidence of stage I diastolic dysfunction. Normal right ventricular size and function. Moderate posterior mitral annular calcification. Focal calcification   mitral valve leaflets. Mild mitral regurgitation. Aortic root is sclerotic and calcified. [facility] nuclear stress test: 12/15/19  1. No reversible perfusion defect   2. Ejection fraction is 62 %. 3. No significant wall motion abnormality     Cardiac catheterization: 12/16/19 (Dr. Tran Herrera)  1. Severe proximal and mid LAD stenosis, status post successful PCI using a drug-eluting stent. 2.  Moderate proximal to mid left circumflex stenosis and moderate ostial PDA stenosis. 3.  Elevated left ventricular end-diastolic pressure. ASSESSMENT / PLAN:  1. CAD s/p PCI to LAD on 12/16/19  2. Chest pain -- no further episodes s/p PCI to LAD  3. HTN -- BP today 134/78 (BP at prior office visits 138/72, 98/58)  4. DM -- on insulin  5. HLD -- on statin  6. BMI 39.5 --> 41.8  7. Chronic RBBB/LAFB  8. CKD  9. H/o kidney stones  10. AUSTIN (AHI 57 in 5/2020) -- compliant with BiPAP  11. [facility] of CAD  12. History of left rotator cuff repair  12. S/p thyroidectomy -- on thyroid replacement  13. History of sinus pauses while hospitalized in 12/2019 (on BB at the time, +AUST[place] -- BB stopped during hospitalization)    - Stopped brilinta at 12/2020 office visit  - Continue current medications otherwise (including ASA, statin, and ARB; BB previously stopped as outlined above)  - Aggressive risk factor modifications  - Treatment of A[place]IN  - Results of most recent labs reviewed with the patient    Greater than 30 minutes was spent counseling the patient, reviewing the rationale for the above recommendations and reviewing the patient's current medication list, problem list and results of all previously ordered testing.     Nicolas Mcmahon MD  [facility]) Cardiology

## 2021-12-20 ENCOUNTER — OFFICE VISIT (OUTPATIENT)
Dept: CARDIOLOGY CLINIC | Age: 71
End: 2021-12-20
Payer: MEDICARE

## 2021-12-20 VITALS
HEART RATE: 74 BPM | DIASTOLIC BLOOD PRESSURE: 78 MMHG | WEIGHT: 258.7 LBS | BODY MASS INDEX: 41.57 KG/M2 | SYSTOLIC BLOOD PRESSURE: 134 MMHG | RESPIRATION RATE: 16 BRPM | HEIGHT: 66 IN

## 2021-12-20 DIAGNOSIS — I25.10 CORONARY ARTERY DISEASE INVOLVING NATIVE CORONARY ARTERY OF NATIVE HEART WITHOUT ANGINA PECTORIS: Primary | ICD-10-CM

## 2021-12-20 DIAGNOSIS — E78.5 HYPERLIPIDEMIA LDL GOAL <100: ICD-10-CM

## 2021-12-20 DIAGNOSIS — R07.2 PRECORDIAL PAIN: ICD-10-CM

## 2021-12-20 DIAGNOSIS — I10 HTN (HYPERTENSION), BENIGN: ICD-10-CM

## 2021-12-20 DIAGNOSIS — Z95.5 HISTORY OF CORONARY ARTERY STENT PLACEMENT: ICD-10-CM

## 2021-12-20 PROBLEM — G25.81 RESTLESS LEG SYNDROME: Status: ACTIVE | Noted: 2021-12-20

## 2021-12-20 PROCEDURE — 99214 OFFICE O/P EST MOD 30 MIN: CPT | Performed by: INTERNAL MEDICINE

## 2021-12-20 PROCEDURE — 93000 ELECTROCARDIOGRAM COMPLETE: CPT | Performed by: INTERNAL MEDICINE

## 2021-12-20 RX ORDER — FLUTICASONE PROPIONATE 50 MCG
SPRAY, SUSPENSION (ML) NASAL
COMMUNITY

## 2022-01-05 ENCOUNTER — HOSPITAL ENCOUNTER (OUTPATIENT)
Dept: ULTRASOUND IMAGING | Age: 72
Discharge: HOME | End: 2022-01-07
Payer: MEDICARE

## 2022-01-05 ENCOUNTER — HOSPITAL ENCOUNTER (OUTPATIENT)
Age: 72
Discharge: HOME | End: 2022-01-07
Payer: MEDICARE

## 2022-01-05 DIAGNOSIS — I12.9 RENAL HYPERTENSION: ICD-10-CM

## 2022-01-05 PROCEDURE — 93975 VASCULAR STUDY: CPT

## 2022-01-05 PROCEDURE — 76770 US EXAM ABDO BACK WALL COMP: CPT

## 2023-01-04 ENCOUNTER — TELEPHONE (OUTPATIENT)
Dept: ADMINISTRATIVE | Age: 73
End: 2023-01-04

## 2023-01-04 NOTE — TELEPHONE ENCOUNTER
Please call patient needs yearly with Dr Troy Stout, had appt in Dec canceled, no issues but she feels needs seen soon because hasn't seen him for over year.

## 2023-01-12 ENCOUNTER — TELEPHONE (OUTPATIENT)
Dept: CARDIOLOGY CLINIC | Age: 73
End: 2023-01-12

## 2023-03-01 ENCOUNTER — OFFICE VISIT (OUTPATIENT)
Dept: CARDIOLOGY CLINIC | Age: 73
End: 2023-03-01
Payer: MEDICARE

## 2023-03-01 VITALS
SYSTOLIC BLOOD PRESSURE: 162 MMHG | BODY MASS INDEX: 40.76 KG/M2 | HEART RATE: 78 BPM | DIASTOLIC BLOOD PRESSURE: 82 MMHG | WEIGHT: 253.6 LBS | HEIGHT: 66 IN

## 2023-03-01 DIAGNOSIS — E78.5 HYPERLIPIDEMIA LDL GOAL <100: ICD-10-CM

## 2023-03-01 DIAGNOSIS — I25.10 CORONARY ARTERY DISEASE INVOLVING NATIVE CORONARY ARTERY OF NATIVE HEART WITHOUT ANGINA PECTORIS: Primary | ICD-10-CM

## 2023-03-01 DIAGNOSIS — I10 HTN (HYPERTENSION), BENIGN: ICD-10-CM

## 2023-03-01 DIAGNOSIS — Z95.5 HISTORY OF CORONARY ARTERY STENT PLACEMENT: ICD-10-CM

## 2023-03-01 PROCEDURE — 3079F DIAST BP 80-89 MM HG: CPT | Performed by: INTERNAL MEDICINE

## 2023-03-01 PROCEDURE — 1123F ACP DISCUSS/DSCN MKR DOCD: CPT | Performed by: INTERNAL MEDICINE

## 2023-03-01 PROCEDURE — 93000 ELECTROCARDIOGRAM COMPLETE: CPT | Performed by: INTERNAL MEDICINE

## 2023-03-01 PROCEDURE — 3077F SYST BP >= 140 MM HG: CPT | Performed by: INTERNAL MEDICINE

## 2023-03-01 PROCEDURE — 99214 OFFICE O/P EST MOD 30 MIN: CPT | Performed by: INTERNAL MEDICINE

## 2023-03-01 NOTE — PROGRESS NOTES
OUTPATIENT CARDIOLOGY FOLLOW-UP    Name: Daphne Jaime    Age: 67 y.o. Primary Care Physician: Guy Teresa MD    Date of Service: 3/1/2023    Chief Complaint: Follow-up for CAD s/p PCI    Interim History:  CAD s/p PCI in 12/2019. She denies recent chest pain, palpitations, syncope, or orthopnea. +long-standing intermittent SOB (not necessarily associated to exertion, no significant changes on/off brilinta, present prior to/after PCI, stable). No new cardiac complaints. SR on EKG. CT abdomen/pelvis pending (work-up for left renal mass).     Review of Systems:   Cardiac: As per HPI  General: No fever, chills  Pulmonary: As per HPI  HEENT: No visual disturbances, difficult swallowing  GI: No nausea, vomiting  : No dysuria, hematuria  Endocrine: +thyroid disorder, +DM  Musculoskeletal: HENRY x 4, no focal motor deficits  Skin: Intact, no rashes  Neuro: No headache, seizures  Psych: Currently with no depression, anxiety    Past Medical History:  Past Medical History:   Diagnosis Date    Arthritis     CAD (coronary artery disease)     follows with Dr Fabio Osorio    Chronic constipation     Diabetes mellitus ([facility] [address]     STABLE PER PT    Hyperlipidemia     Hypertension     STABLE PER PT    Kidney stones     Lip lesion     uses dermatology creas for precancer of lip & section of face near eyes    [place] treated with BiPAP     Prolonged emergence from general anesthesia     Restless leg syndrome     Urinary incontinence        Past Surgical History:  Past Surgical History:   Procedure Laterality Date    CARDIAC CATHETERIZATION  12/16/2019    Dr. Rodney Gilliam- [facility] PERRY LAD 3.0 x 26    CARPAL TUNNEL RELEASE      bilateral    CATARACT EXTRACTION W/  INTRAOCULAR LENS IMPLANT Left 02/07/2017    CATARACT EXTRACTION W/  INTRAOCULAR LENS IMPLANT Right 04/06/2017    COLONOSCOPY      CYSTOSCOPY  03/02/2012    retrograde and cultures    CYSTOSCOPY  03/14/2012    cysto, laser, litho, stent placement    CYSTOSCOPY  4/22/2014 laser litho, stent insertion    FRACTURE SURGERY      orif left foot    HAMMER TOE SURGERY Left     HERNIA REPAIR      umbilical hernia    HYSTERECTOMY      PARTIAL    KIDNEY STONE SURGERY      LIPOMA RESECTION       RIGHTCALF AND UPPER LEFT ARM    NERVE SURGERY N/A 12/18/2020    REVISION OF INTERSTIM performed by Nba Olmedo DO at [facility] OR    OTHER SURGICAL HISTORY N/A 10/24/14    EXPLANT OF UROTSTIM IMPLANT OF PERIPHERAL NEROSTIM PULSE GENERATOR    ROTATOR CUFF REPAIR      left       Family History:  Family History   Problem Relation Age of Onset    Heart Attack Father 48     Father -- passed away from an MI at age 64  Sister (3years older than patient) -- CAD s/p 3V CABG    Social History:  Social History     Socioeconomic History    Marital status:      Spouse name: Not on file    Number of children: Not on file    Years of education: Not on file    Highest education level: Not on file   Occupational History    Not on file   Tobacco Use    Smoking status: Never    Smokeless tobacco: Never   Vaping Use    Vaping Use: Never used   Substance and Sexual Activity    Alcohol use: No    Drug use: No    Sexual activity: Not on file   Other Topics Concern    Not on file   Social History Narrative    Not on file     Social Determinants of Health     Financial Resource Strain: Not on file   Food Insecurity: Not on file   Transportation Needs: Not on file   Physical Activity: Not on file   Stress: Not on file   Social Connections: Not on file   Intimate Partner Violence: Not on file   Housing Stability: Not on file       Allergies:   Allergies   Allergen Reactions    Ciprofloxacin Rash    Dye [Iodides] Other (See Comments)     Renal failure      Levaquin [Levofloxacin]     Red Dye     Adhesive Tape Rash    Penicillins Swelling and Rash    Sulfa Antibiotics Swelling and Rash       Current Medications:  Current Outpatient Medications   Medication Sig Dispense Refill    fluticasone (FLONASE) 50 MCG/ACT nasal spray Flonase Allergy Relief 50 mcg/actuation nasal spray,suspension   Spray 2 sprays as needed by intranasal route. TOUJEO SOLOSTAR 300 UNIT/ML SOPN 22 Units 2 times daily Lunch and dinner  Take 1/2 dose 12/17/2020 pm      Potassium Citrate ER 15 MEQ (1620 MG) TBCR Take 1 tablet by mouth daily       aspirin 81 MG EC tablet Take 1 tablet by mouth daily 30 tablet 0    vitamin D (CHOLECALCIFEROL) 125 MCG (5000 UT) CAPS capsule Take 5,000 Units by mouth daily Ld 12/15      metFORMIN (GLUCOPHAGE-XR) 500 MG extended release tablet Take 1,000 mg by mouth 2 times daily (with meals)      cephALEXin (KEFLEX) 250 MG capsule Take 250 mg by mouth Twice a Week      Levothyroxine Sodium 137 MCG CAPS Take 137 mcg by mouth Daily       Semaglutide (OZEMPIC, 0.25 OR 0.5 MG/DOSE, SC) Inject 1 mg into the skin once a week Ld 12/13      Estradiol (VAGIFEM) 10 MCG TABS vaginal tablet Place 10 mcg vaginally Twice a Week      docusate sodium (COLACE) 100 MG capsule Take 300 mg by mouth nightly       cyanocobalamin 1000 MCG/ML injection Inject 1,000 mcg into the muscle every 30 days       Cranberry 500 MG CAPS Take 500 mg by mouth daily Ld 12/14      Alpha-Lipoic Acid 600 MG CAPS Take 600 mg by mouth daily Ld 12/14      polyethylene glycol (GLYCOLAX) packet Take 17 g by mouth daily       Multiple Vitamins-Minerals (CENTRUM SILVER ADULT 50+ PO) Take 1 tablet by mouth daily Ld 12/15      oxybutynin (DITROPAN) 5 MG tablet Take 10 mg by mouth 2 times daily Take two in the morning and two at night       ammonium lactate (LAC-HYDRIN) 12 % lotion Apply topically daily Apply topically as needed.    Uses on heels daily      niacin 500 MG tablet Take 500 mg by mouth daily (with breakfast) Ld 12/14      losartan (COZAAR) 50 MG tablet Take 50 mg by mouth daily       Insulin Lispro, Human, (HUMALOG PEN SC) Inject into the skin SLIDING SCALE BASE    120-180 27units in am and 33 units at dinner      atorvastatin (LIPITOR) 40 MG tablet Take 40 mg by mouth every evening       rOPINIRole (REQUIP) 2 MG tablet Take 2 mg by mouth nightly       ketorolac (TORADOL) 10 MG tablet Take 1 tablet by mouth every 6 hours as needed for Pain 20 tablet 0     No current facility-administered medications for this visit. Physical Exam:  BP (!) 162/82   Pulse 78   Ht 5' 6\" (1.676 m)   Wt 253 lb 9.6 oz (115 kg)   BMI 40.93 kg/m²   Wt Readings from Last 3 Encounters:   03/01/23 253 lb 9.6 oz (115 kg)   12/20/21 258 lb 11.2 oz (117.3 kg)   12/18/20 243 lb (110.2 kg)     Appearance: Awake, alert and oriented x 3, no acute respiratory distress  Skin: Intact, no rash  Head: Normocephalic, atraumatic  Eyes: EOMI, no conjunctival erythema  ENMT: No pharyngeal erythema, MMM, no rhinorrhea  Neck: Supple, no carotid bruits  Lungs: Clear to auscultation bilaterally. No wheezes, rales, or rhonchi. Cardiac: Regular rate and rhythm, +S1S2, no murmurs apparent  Abdomen: Soft, nontender, +bowel sounds  Extremities: Moves all extremities x 4, no lower extremity edema  Neurologic: No focal motor deficits apparent, normal mood and affect, alert and oriented x 3    Laboratory Tests:  Lab Results   Component Value Date    CREATININE 1.1 (H) 12/17/2019    BUN 16 12/17/2019     12/17/2019    K 3.9 12/17/2019     (H) 12/17/2019    CO2 17 (L) 12/17/2019     CrCl cannot be calculated (Patient's most recent lab result is older than the maximum 180 days allowed. ).     Lab Results   Component Value Date/Time    MG 1.7 12/17/2019 05:32 AM     Lab Results   Component Value Date    WBC 6.3 12/17/2019    HGB 11.0 (L) 12/17/2019    HCT 37.1 12/17/2019    MCV 88.1 12/17/2019     12/17/2019     Lab Results   Component Value Date    ALT 17 12/16/2019    AST 51 (H) 12/16/2019    ALKPHOS 51 12/16/2019    BILITOT 0.4 12/16/2019     Lab Results   Component Value Date    CKTOTAL 71 09/04/2011    CKMB 2.5 09/04/2011    TROPONINI <0.01 12/14/2019    TROPONINI <0.01 12/13/2019    TROPONINI <0.01 12/13/2019     Lab Results   Component Value Date    INR 1.1 12/13/2019    INR 1.0 12/13/2019    INR 1.4 09/03/2011    PROTIME 12.1 12/13/2019    PROTIME 11.0 12/13/2019    PROTIME 13.7 (H) 09/03/2011     Lab Results   Component Value Date    TSH 1.670 12/13/2019     No results found for: LABA1C  No results found for: EAG  Lab Results   Component Value Date    CHOL 130 12/14/2019     Lab Results   Component Value Date    TRIG 174 (H) 12/14/2019     Lab Results   Component Value Date    HDL 71 12/14/2019     Lab Results   Component Value Date    LDLCALC 24 12/14/2019     Lab Results   Component Value Date    LABVLDL 35 12/14/2019     No results found for: CHOLHDLRATIO  No results for input(s): PROBNP in the last 72 hours. Cardiac Tests:  ECG: SR, rate 78, 1st degree AV block, RBBB/LAFB    Echocardiogram: 12/15/19 (Dr. Arvind Dash)   Left ventricle is normal in size. Mild concentric left ventricular hypertrophy. No regional wall motion abnormalities seen. Ejection fraction is visually estimated at 55-60%. There is doppler evidence of stage I diastolic dysfunction. Normal right ventricular size and function. Moderate posterior mitral annular calcification. Focal calcification   mitral valve leaflets. Mild mitral regurgitation. Aortic root is sclerotic and calcified. [facility] nuclear stress test: 12/15/19  1. No reversible perfusion defect   2. Ejection fraction is 62 %. 3. No significant wall motion abnormality     Cardiac catheterization: 12/16/19 (Dr. Carl Sethi)  1. Severe proximal and mid LAD stenosis, status post successful PCI using a drug-eluting stent. 2.  Moderate proximal to mid left circumflex stenosis and moderate ostial PDA stenosis. 3.  Elevated left ventricular end-diastolic pressure.     ASSESSMENT / PLAN:  CAD s/p PCI to LAD on 12/16/19  Chest pain -- no further episodes s/p PCI to LAD  HTN -- BP today 162/82 (BP at prior office visits 138/72, 98/58, 134/78)  DM -- on insulin  HLD -- on statin  BMI 39.5 --> 41.8 --> 40.9  Chronic RBBB/LAFB  CKD  H/o kidney stones  AUSTIN (AHI 57 in 5/2020) -- compliant with BiPAP  [facility] of CAD  History of left rotator cuff repair  12. S/p thyroidectomy -- on thyroid replacement  13. History of sinus pauses while hospitalized in 12/2019 (on BB at the time, +[place] -- BB stopped during hospitalization)  14. Dye allergy  15. Left-sided renal mass    - Stopped brilinta at 12/2020 office visit  - Continue current medications otherwise (including ASA, statin, and ARB; BB previously stopped as outlined above)  - Aggressive risk factor modifications  - Treatment of [place]  - Monitor labs  - Follow-up results of urology work-up (CT pending)    Greater than 30 minutes was spent counseling the patient, reviewing the rationale for the above recommendations and reviewing the patient's current medication list, problem list and results of all previously ordered testing.     Mika Tse MD  [facility]) Cardiology

## 2023-03-24 ENCOUNTER — HOSPITAL ENCOUNTER (OUTPATIENT)
Age: 73
End: 2023-03-24
Payer: MEDICARE

## 2023-03-24 ENCOUNTER — HOSPITAL ENCOUNTER (OUTPATIENT)
Dept: GENERAL RADIOLOGY | Age: 73
End: 2023-03-24
Payer: MEDICARE

## 2023-03-24 DIAGNOSIS — M25.551 RIGHT HIP PAIN: ICD-10-CM

## 2023-03-24 PROCEDURE — 73502 X-RAY EXAM HIP UNI 2-3 VIEWS: CPT

## 2023-06-22 ENCOUNTER — HOSPITAL ENCOUNTER (OUTPATIENT)
Dept: ULTRASOUND IMAGING | Age: 73
Discharge: HOME | End: 2023-06-24
Payer: MEDICARE

## 2023-06-22 DIAGNOSIS — R19.7 DIARRHEA, UNSPECIFIED TYPE: ICD-10-CM

## 2023-06-22 PROCEDURE — 76705 ECHO EXAM OF ABDOMEN: CPT

## 2023-06-29 ENCOUNTER — HOSPITAL ENCOUNTER (OUTPATIENT)
Dept: NUCLEAR MEDICINE | Age: 73
Discharge: HOME | End: 2023-06-29
Attending: FAMILY MEDICINE
Payer: MEDICARE

## 2023-06-29 VITALS — BODY MASS INDEX: 40.35 KG/M2 | WEIGHT: 250 LBS

## 2023-06-29 DIAGNOSIS — R14.0 ABDOMINAL DISTENTION: ICD-10-CM

## 2023-06-29 DIAGNOSIS — R10.9 ABDOMINAL PAIN, UNSPECIFIED ABDOMINAL LOCATION: ICD-10-CM

## 2023-06-29 PROCEDURE — 6360000002 HC RX W HCPCS: Performed by: RADIOLOGY

## 2023-06-29 PROCEDURE — 3430000000 HC RX DIAGNOSTIC RADIOPHARMACEUTICAL: Performed by: RADIOLOGY

## 2023-06-29 PROCEDURE — 78227 HEPATOBIL SYST IMAGE W/DRUG: CPT

## 2023-06-29 PROCEDURE — A9537 TC99M MEBROFENIN: HCPCS | Performed by: RADIOLOGY

## 2023-06-29 PROCEDURE — 2580000003 HC RX 258: Performed by: RADIOLOGY

## 2023-06-29 RX ADMIN — SODIUM CHLORIDE 2.27 MCG: 9 INJECTION, SOLUTION INTRAVENOUS at 08:31

## 2023-06-29 RX ADMIN — Medication 6 MILLICURIE: at 07:27

## 2024-02-25 ENCOUNTER — APPOINTMENT (OUTPATIENT)
Dept: GENERAL RADIOLOGY | Age: 74
End: 2024-02-25
Payer: MEDICARE

## 2024-02-25 ENCOUNTER — HOSPITAL ENCOUNTER (EMERGENCY)
Age: 74
Discharge: HOME | End: 2024-02-25
Payer: MEDICARE

## 2024-02-25 VITALS
WEIGHT: 242 LBS | SYSTOLIC BLOOD PRESSURE: 142 MMHG | BODY MASS INDEX: 38.89 KG/M2 | TEMPERATURE: 98.9 F | RESPIRATION RATE: 14 BRPM | OXYGEN SATURATION: 97 % | DIASTOLIC BLOOD PRESSURE: 80 MMHG | HEART RATE: 72 BPM | HEIGHT: 66 IN

## 2024-02-25 DIAGNOSIS — M25.551 CHRONIC RIGHT HIP PAIN: Primary | ICD-10-CM

## 2024-02-25 DIAGNOSIS — G89.29 CHRONIC RIGHT HIP PAIN: Primary | ICD-10-CM

## 2024-02-25 DIAGNOSIS — M62.830 SPASM OF LUMBAR PARASPINOUS MUSCLE: ICD-10-CM

## 2024-02-25 DIAGNOSIS — S39.012A LUMBOSACRAL STRAIN, INITIAL ENCOUNTER: ICD-10-CM

## 2024-02-25 PROCEDURE — 6360000002 HC RX W HCPCS: Performed by: NURSE PRACTITIONER

## 2024-02-25 PROCEDURE — 99284 EMERGENCY DEPT VISIT MOD MDM: CPT

## 2024-02-25 PROCEDURE — 73502 X-RAY EXAM HIP UNI 2-3 VIEWS: CPT

## 2024-02-25 PROCEDURE — 72100 X-RAY EXAM L-S SPINE 2/3 VWS: CPT

## 2024-02-25 PROCEDURE — 6370000000 HC RX 637 (ALT 250 FOR IP): Performed by: NURSE PRACTITIONER

## 2024-02-25 PROCEDURE — 96372 THER/PROPH/DIAG INJ SC/IM: CPT

## 2024-02-25 RX ORDER — OXYCODONE HYDROCHLORIDE AND ACETAMINOPHEN 5; 325 MG/1; MG/1
1 TABLET ORAL ONCE
Status: DISCONTINUED | OUTPATIENT
Start: 2024-02-25 | End: 2024-02-25

## 2024-02-25 RX ORDER — LIDOCAINE 4 G/G
1 PATCH TOPICAL DAILY
Qty: 15 PATCH | Refills: 0 | Status: SHIPPED | OUTPATIENT
Start: 2024-02-25 | End: 2024-03-11

## 2024-02-25 RX ORDER — CYCLOBENZAPRINE HCL 5 MG
5 TABLET ORAL 2 TIMES DAILY PRN
Qty: 14 TABLET | Refills: 0 | Status: SHIPPED | OUTPATIENT
Start: 2024-02-25 | End: 2024-03-03

## 2024-02-25 RX ORDER — ORPHENADRINE CITRATE 30 MG/ML
60 INJECTION INTRAMUSCULAR; INTRAVENOUS ONCE
Status: COMPLETED | OUTPATIENT
Start: 2024-02-25 | End: 2024-02-25

## 2024-02-25 RX ORDER — ACETAMINOPHEN 500 MG
1000 TABLET ORAL ONCE
Status: DISCONTINUED | OUTPATIENT
Start: 2024-02-25 | End: 2024-02-25

## 2024-02-25 RX ORDER — LIDOCAINE 4 G/G
1 PATCH TOPICAL ONCE
Status: DISCONTINUED | OUTPATIENT
Start: 2024-02-25 | End: 2024-02-25 | Stop reason: HOSPADM

## 2024-02-25 RX ADMIN — ORPHENADRINE CITRATE 60 MG: 60 INJECTION INTRAMUSCULAR; INTRAVENOUS at 09:37

## 2024-02-25 ASSESSMENT — PAIN DESCRIPTION - LOCATION: LOCATION: LEG;HIP

## 2024-02-25 ASSESSMENT — PAIN SCALES - GENERAL: PAINLEVEL_OUTOF10: 10

## 2024-02-25 ASSESSMENT — PAIN DESCRIPTION - ORIENTATION: ORIENTATION: RIGHT

## 2024-02-25 NOTE — DISCHARGE INSTRUCTIONS
XR HIP 2-3 VW W PELVIS RIGHT   Final Result   1. There is no fracture dislocation of the right hip and there is no fracture   of pelvis   2. Advanced degenerative changes of the right and left hips.         XR LUMBAR SPINE (2-3 VIEWS)   Final Result   1. There is no acute compression fracture or osseous lesion   2. Advanced multilevel degenerative disc and degenerative joint disease.

## 2024-02-25 NOTE — ED PROVIDER NOTES
degenerative disc and degenerative joint disease.           No results found.    No results found.    PROCEDURES   Unless otherwise noted below, none          CRITICAL CARE TIME (.cct)   N/a    PAST MEDICAL HISTORY/Chronic Conditions Affecting Care      has a past medical history of Arthritis, CAD (coronary artery disease), Chronic constipation, Diabetes mellitus (HCC), Hyperlipidemia, Hypertension, Kidney stones, Lip lesion, [place] treated with BiPAP, Prolonged emergence from general anesthesia, Restless leg syndrome, and Urinary incontinence.     EMERGENCY DEPARTMENT COURSE    Vitals:    Vitals:    02/25/24 0856   BP: (!) 142/80   Pulse: 72   Resp: 14   Temp: 98.9 °F (37.2 °C)   TempSrc: Oral   SpO2: 97%   Weight: 109.8 kg (242 lb)   Height: 1.676 m (5' 6\")       Patient was given the following medications:  Medications   orphenadrine (NORFLEX) injection 60 mg (60 mg IntraMUSCular Given 2/25/24 0937)               Medical Decision Making/Differential Diagnosis:    CC/HPI Summary, Social Determinants of health, Records Reviewed, DDx, testing done/not done, ED Course, Reassessment, disposition considerations/shared decision making with patient, consults, disposition:            Medical Decision Making  Amount and/or Complexity of Data Reviewed  Radiology: ordered.    Risk  OTC drugs.  Prescription drug management.      Patient presenting to the ED today for right lumbar back pain and chronic right hip pain with muscle spasm. Vs wnl. Physical examination without red flag warning signs. Imaging obtained which is interpreted by radiologist as negative for any acute findings, advanced degenerative changes of hips bilaterally, multilevel degenerative disc and degenerative joint disease. Patient will be treated for muscle spasm / lumbar strain without evidence of fracture or neurologic compromise. Patient educated to take tylenol as needed as well as cyclobenzaprine and lidocaine patch. Patient will make a follow up

## 2024-03-07 ENCOUNTER — OFFICE VISIT (OUTPATIENT)
Dept: CARDIOLOGY CLINIC | Age: 74
End: 2024-03-07
Payer: MEDICARE

## 2024-03-07 VITALS
HEIGHT: 66 IN | RESPIRATION RATE: 18 BRPM | SYSTOLIC BLOOD PRESSURE: 126 MMHG | WEIGHT: 251.6 LBS | BODY MASS INDEX: 40.43 KG/M2 | HEART RATE: 77 BPM | DIASTOLIC BLOOD PRESSURE: 72 MMHG

## 2024-03-07 DIAGNOSIS — G47.33 OSA (OBSTRUCTIVE SLEEP APNEA): Chronic | ICD-10-CM

## 2024-03-07 DIAGNOSIS — E78.5 HYPERLIPIDEMIA LDL GOAL <100: ICD-10-CM

## 2024-03-07 DIAGNOSIS — I25.10 CORONARY ARTERY DISEASE INVOLVING NATIVE CORONARY ARTERY OF NATIVE HEART WITHOUT ANGINA PECTORIS: Primary | ICD-10-CM

## 2024-03-07 DIAGNOSIS — I10 HTN (HYPERTENSION), BENIGN: ICD-10-CM

## 2024-03-07 DIAGNOSIS — Z95.5 HISTORY OF CORONARY ARTERY STENT PLACEMENT: ICD-10-CM

## 2024-03-07 PROCEDURE — 99213 OFFICE O/P EST LOW 20 MIN: CPT | Performed by: INTERNAL MEDICINE

## 2024-03-07 PROCEDURE — 3078F DIAST BP <80 MM HG: CPT | Performed by: INTERNAL MEDICINE

## 2024-03-07 PROCEDURE — 93000 ELECTROCARDIOGRAM COMPLETE: CPT | Performed by: INTERNAL MEDICINE

## 2024-03-07 PROCEDURE — 1123F ACP DISCUSS/DSCN MKR DOCD: CPT | Performed by: INTERNAL MEDICINE

## 2024-03-07 PROCEDURE — 3074F SYST BP LT 130 MM HG: CPT | Performed by: INTERNAL MEDICINE

## 2024-03-07 RX ORDER — TIRZEPATIDE 10 MG/.5ML
10 INJECTION, SOLUTION SUBCUTANEOUS WEEKLY
COMMUNITY

## 2024-03-07 RX ORDER — CEFDINIR 300 MG/1
300 CAPSULE ORAL
COMMUNITY
Start: 2024-03-06

## 2024-03-07 RX ORDER — GABAPENTIN 100 MG/1
600 CAPSULE ORAL 2 TIMES DAILY
COMMUNITY
Start: 2024-02-09

## 2024-03-07 RX ORDER — MONTELUKAST SODIUM 4 MG/1
1 TABLET, CHEWABLE ORAL 2 TIMES DAILY PRN
COMMUNITY

## 2024-03-08 NOTE — PROGRESS NOTES
mid LAD stenosis, status post successful PCI using a drug-eluting stent.  2.  Moderate proximal to mid left circumflex stenosis and moderate ostial PDA stenosis.  3.  Elevated left ventricular end-diastolic pressure.    ASSESSMENT / PLAN:  CAD s/p PCI to LAD on 12/16/19  Chest pain -- no further episodes s/p PCI to LAD  HTN -- BP today 126/72 (BP at prior office visits 138/72, 98/58, 134/78, 162/82)  DM -- on insulin  HLD -- on statin  BMI 39.5 --> 41.8 --> 40.9 --> 40.6  Chronic RBBB/LAFB  CKD  H/o kidney stones  AUSTIN (AHI 57 in 5/2020) -- compliant with BiPAP  FMH of CAD  History of left rotator cuff repair  12. S/p thyroidectomy -- on thyroid replacement  13. History of sinus pauses while hospitalized in 12/2019 (on BB at the time, +AUSTIN -- BB stopped during hospitalization)  14. Dye allergy    - Stopped brilinta at 12/2020 office visit  - Continue current medications otherwise (including ASA, statin, and ARB; BB previously stopped as outlined above)  - Aggressive risk factor modifications  - Treatment of AUSTIN  - Monitor labs  - Urology office note reviewed      Jona Guajardo MD  [facility] Cardiology

## 2024-05-02 ENCOUNTER — HOSPITAL ENCOUNTER (OUTPATIENT)
Dept: MRI IMAGING | Age: 74
Discharge: HOME | End: 2024-05-04

## 2024-05-02 DIAGNOSIS — M25.561 RIGHT KNEE PAIN, UNSPECIFIED CHRONICITY: ICD-10-CM

## 2024-05-02 DIAGNOSIS — M17.11 OSTEOARTHROSIS, LOCALIZED, PRIMARY, INVOLVING LOWER LEG, RIGHT: ICD-10-CM

## 2024-09-27 ENCOUNTER — PREP FOR PROCEDURE (OUTPATIENT)
Dept: UROLOGY | Age: 74
End: 2024-09-27

## 2024-09-27 RX ORDER — SODIUM CHLORIDE 9 MG/ML
INJECTION, SOLUTION INTRAVENOUS PRN
Status: CANCELLED | OUTPATIENT
Start: 2024-09-27

## 2024-09-27 RX ORDER — SODIUM CHLORIDE 0.9 % (FLUSH) 0.9 %
5-40 SYRINGE (ML) INJECTION PRN
Status: CANCELLED | OUTPATIENT
Start: 2024-09-27

## 2024-09-27 RX ORDER — SODIUM CHLORIDE 0.9 % (FLUSH) 0.9 %
5-40 SYRINGE (ML) INJECTION EVERY 12 HOURS SCHEDULED
Status: CANCELLED | OUTPATIENT
Start: 2024-09-27

## 2024-10-02 RX ORDER — MELOXICAM 7.5 MG/1
7.5 TABLET ORAL DAILY
COMMUNITY

## 2024-10-02 NOTE — PROGRESS NOTES
[facility] PRE-ADMISSION TESTING INSTRUCTIONS    The Preadmission Testing patient is instructed accordingly using the following criteria (check applicable):    ARRIVAL INSTRUCTIONS:  [x] Parking the day of Surgery is located in the Main Entrance lot.  Upon entering the door, make an immediate right to the surgery reception desk    [x] Bring photo ID and insurance card    [] Bring in a copy of Living will or Durable Power of  papers.    [x] Please be sure to arrange for responsible adult to provide transportation to and from the hospital    [x] Please arrange for responsible adult to be with you for the 24 hour period post procedure due to having anesthesia    [x] If you awake am of surgery not feeling well or have temperature >100 please call [phone]    GENERAL INSTRUCTIONS:    [x] Nothing by mouth after midnight, including gum, candy, mints or water    [x] You may brush your teeth, but do not swallow any water    [] Take medications as instructed with 1-2 oz of water    [x] Stop herbal supplements and vitamins 5 days prior to procedure    [x] Follow preop dosing of blood thinners per physician instructions    [x] Take 1/2 dose of evening insulin, but no insulin after midnight    [x] No oral diabetic medications after midnight    [x] If diabetic and have low blood sugar or feel symptomatic, take 1-2oz apple juice only    [] Bring inhalers day of surgery    [] Bring C-PAP/ Bi-Pap day of surgery    [] Bring urine specimen day of surgery    [x] Shower or bath with soap, lather and rinse well, AM of Surgery, no lotion, powders or creams to surgical site    [] Follow bowel prep as instructed per surgeon    [x] No tobacco products within 24 hours of surgery     [x] No alcohol or illegal drug use within 24 hours of surgery.    [x] Jewelry, body piercing's, eyeglasses, contact lenses and dentures are not permitted into surgery (bring cases)      [x] Please do not wear any nail polish,  make up or hair products on the day of surgery    [x] You can expect a call the business day prior to procedure to notify you if your arrival time changes    [x] If you receive a survey after surgery we would greatly appreciate your comments    [] Parent/guardian of a minor must accompany their child and remain on the premises  the entire time they are under our care     [] Pediatric patients may bring favorite toy, blanket or comfort item with them    [] A caregiver or family member must remain with the patient during their stay if they are mentally handicapped, have dementia, disoriented or unable to use a call light or would be a safety concern if left unattended    [x] Please notify surgeon if you develop any illness between now and time of surgery (cold, cough, sore throat, fever, nausea, vomiting) or any signs of infections  including skin, wounds, and dental.    []  The Outpatient Pharmacy is available to fill your prescription here on your day of surgery, ask your preop nurse for details    [x] Other instructions: Wear comfortable clothing    EDUCATIONAL MATERIALS PROVIDED:    [] PAT Preoperative Education Packet/Booklet     [] Medication List    [] Transfusion bracelet applied with instructions    [] Shower with soap, lather and rinse well, and use CHG wipes provided the evening before surgery as instructed    [] Incentive spirometer with instructions

## 2024-10-04 ENCOUNTER — HOSPITAL ENCOUNTER (OUTPATIENT)
Age: 74
Setting detail: OUTPATIENT SURGERY
Discharge: HOME | End: 2024-10-04
Attending: UROLOGY | Admitting: UROLOGY
Payer: MEDICARE

## 2024-10-04 ENCOUNTER — ANESTHESIA EVENT (OUTPATIENT)
Dept: OPERATING ROOM | Age: 74
End: 2024-10-04
Payer: MEDICARE

## 2024-10-04 ENCOUNTER — ANESTHESIA (OUTPATIENT)
Dept: OPERATING ROOM | Age: 74
End: 2024-10-04
Payer: MEDICARE

## 2024-10-04 ENCOUNTER — HOSPITAL ENCOUNTER (OUTPATIENT)
Dept: GENERAL RADIOLOGY | Age: 74
Setting detail: OUTPATIENT SURGERY
Discharge: HOME | End: 2024-10-06
Attending: UROLOGY
Payer: MEDICARE

## 2024-10-04 VITALS
DIASTOLIC BLOOD PRESSURE: 68 MMHG | TEMPERATURE: 97 F | RESPIRATION RATE: 18 BRPM | HEART RATE: 86 BPM | BODY MASS INDEX: 40.18 KG/M2 | WEIGHT: 250 LBS | HEIGHT: 66 IN | SYSTOLIC BLOOD PRESSURE: 138 MMHG | OXYGEN SATURATION: 97 %

## 2024-10-04 DIAGNOSIS — N39.46 MIXED INCONTINENCE URGE AND STRESS (MALE)(FEMALE): ICD-10-CM

## 2024-10-04 DIAGNOSIS — R52 PAIN: ICD-10-CM

## 2024-10-04 PROCEDURE — C1767 GENERATOR, NEURO NON-RECHARG: HCPCS | Performed by: UROLOGY

## 2024-10-04 PROCEDURE — 7100000011 HC PHASE II RECOVERY - ADDTL 15 MIN: Performed by: UROLOGY

## 2024-10-04 PROCEDURE — 7100000010 HC PHASE II RECOVERY - FIRST 15 MIN: Performed by: UROLOGY

## 2024-10-04 PROCEDURE — C1889 IMPLANT/INSERT DEVICE, NOC: HCPCS | Performed by: UROLOGY

## 2024-10-04 PROCEDURE — 3700000000 HC ANESTHESIA ATTENDED CARE: Performed by: UROLOGY

## 2024-10-04 PROCEDURE — 6360000002 HC RX W HCPCS

## 2024-10-04 PROCEDURE — C1778 LEAD, NEUROSTIMULATOR: HCPCS | Performed by: UROLOGY

## 2024-10-04 PROCEDURE — C1787 PATIENT PROGR, NEUROSTIM: HCPCS | Performed by: UROLOGY

## 2024-10-04 PROCEDURE — 3600000013 HC SURGERY LEVEL 3 ADDTL 15MIN: Performed by: UROLOGY

## 2024-10-04 PROCEDURE — 88300 SURGICAL PATH GROSS: CPT

## 2024-10-04 PROCEDURE — 2580000003 HC RX 258

## 2024-10-04 PROCEDURE — 6360000002 HC RX W HCPCS: Performed by: NURSE ANESTHETIST, CERTIFIED REGISTERED

## 2024-10-04 PROCEDURE — 2709999900 HC NON-CHARGEABLE SUPPLY: Performed by: UROLOGY

## 2024-10-04 PROCEDURE — 3700000001 HC ADD 15 MINUTES (ANESTHESIA): Performed by: UROLOGY

## 2024-10-04 PROCEDURE — C1897 LEAD, NEUROSTIM TEST KIT: HCPCS | Performed by: UROLOGY

## 2024-10-04 PROCEDURE — 6360000002 HC RX W HCPCS: Performed by: UROLOGY

## 2024-10-04 PROCEDURE — 3600000003 HC SURGERY LEVEL 3 BASE: Performed by: UROLOGY

## 2024-10-04 DEVICE — NEUROSTIMULATOR INTERSTIM X RECHRGE FREE TORQ WRNCH PROD: Type: IMPLANTABLE DEVICE | Site: SACRUM | Status: FUNCTIONAL

## 2024-10-04 DEVICE — LEAD NERVE STIM 4.32 MM INTERSTIM SURESCAN: Type: IMPLANTABLE DEVICE | Site: SACRUM | Status: FUNCTIONAL

## 2024-10-04 DEVICE — ENVELOPE NMRM6122 ABSORB MED MR
Type: IMPLANTABLE DEVICE | Site: SACRUM | Status: FUNCTIONAL
Brand: TYRX™

## 2024-10-04 RX ORDER — CEPHALEXIN 500 MG/1
500 CAPSULE ORAL 3 TIMES DAILY
Qty: 15 CAPSULE | Refills: 0 | Status: SHIPPED | OUTPATIENT
Start: 2024-10-04 | End: 2024-10-09

## 2024-10-04 RX ORDER — BUPIVACAINE HYDROCHLORIDE 5 MG/ML
INJECTION, SOLUTION EPIDURAL; INTRACAUDAL PRN
Status: DISCONTINUED | OUTPATIENT
Start: 2024-10-04 | End: 2024-10-04 | Stop reason: ALTCHOICE

## 2024-10-04 RX ORDER — SODIUM CHLORIDE 0.9 % (FLUSH) 0.9 %
5-40 SYRINGE (ML) INJECTION EVERY 12 HOURS SCHEDULED
Status: DISCONTINUED | OUTPATIENT
Start: 2024-10-04 | End: 2024-10-04 | Stop reason: HOSPADM

## 2024-10-04 RX ORDER — FENTANYL CITRATE 50 UG/ML
INJECTION, SOLUTION INTRAMUSCULAR; INTRAVENOUS
Status: DISCONTINUED | OUTPATIENT
Start: 2024-10-04 | End: 2024-10-04 | Stop reason: SDUPTHER

## 2024-10-04 RX ORDER — SODIUM CHLORIDE 9 MG/ML
INJECTION, SOLUTION INTRAVENOUS PRN
Status: DISCONTINUED | OUTPATIENT
Start: 2024-10-04 | End: 2024-10-04 | Stop reason: HOSPADM

## 2024-10-04 RX ORDER — PROPOFOL 10 MG/ML
INJECTION, EMULSION INTRAVENOUS
Status: DISCONTINUED | OUTPATIENT
Start: 2024-10-04 | End: 2024-10-04 | Stop reason: SDUPTHER

## 2024-10-04 RX ORDER — SODIUM CHLORIDE 0.9 % (FLUSH) 0.9 %
5-40 SYRINGE (ML) INJECTION PRN
Status: DISCONTINUED | OUTPATIENT
Start: 2024-10-04 | End: 2024-10-04 | Stop reason: HOSPADM

## 2024-10-04 RX ORDER — CEPHALEXIN 500 MG/1
500 CAPSULE ORAL 2 TIMES DAILY
Qty: 10 CAPSULE | Refills: 0 | OUTPATIENT
Start: 2024-10-04 | End: 2024-10-09

## 2024-10-04 RX ADMIN — FENTANYL CITRATE 50 MCG: 50 INJECTION, SOLUTION INTRAMUSCULAR; INTRAVENOUS at 10:52

## 2024-10-04 RX ADMIN — PROPOFOL 150 MCG/KG/MIN: 10 INJECTION, EMULSION INTRAVENOUS at 10:54

## 2024-10-04 RX ADMIN — FENTANYL CITRATE 50 MCG: 50 INJECTION, SOLUTION INTRAMUSCULAR; INTRAVENOUS at 11:02

## 2024-10-04 RX ADMIN — PROPOFOL 50 MG: 10 INJECTION, EMULSION INTRAVENOUS at 10:52

## 2024-10-04 RX ADMIN — GENTAMICIN SULFATE 560 MG: 40 INJECTION, SOLUTION INTRAMUSCULAR; INTRAVENOUS at 10:35

## 2024-10-04 RX ADMIN — SODIUM CHLORIDE: 9 INJECTION, SOLUTION INTRAVENOUS at 09:46

## 2024-10-04 ASSESSMENT — PAIN - FUNCTIONAL ASSESSMENT: PAIN_FUNCTIONAL_ASSESSMENT: NONE - DENIES PAIN

## 2024-10-04 ASSESSMENT — ENCOUNTER SYMPTOMS: DYSPNEA ACTIVITY LEVEL: AFTER AMBULATING 1 FLIGHT OF STAIRS

## 2024-10-04 NOTE — BRIEF OP NOTE
Brief Postoperative Note      Patient: Abigail Orellana  YOB: 1950  MRN: [id number]    Date of Procedure: 10/4/2024    Pre-Op Diagnosis Codes:      * Mixed incontinence urge and stress (male)(female) [N39.46]    Post-Op Diagnosis: Same       Procedure(s):  EXPLANT OF NEUROSTIM ELECTRODE WHOLE IMPLANT INTERSTIM    ++MEDTRONIC++    +++IODINE ALERGY+++    Surgeon(s):  Adis Olmedo DO    Assistant:  * No surgical staff found *    Anesthesia: Monitor Anesthesia Care    Estimated Blood Loss (mL): Minimal    Complications: None    Specimens:   * No specimens in log *    Implants:  * No implants in log *      Drains: * No LDAs found *    Findings:  Infection Present At Time Of Surgery (PATOS) (choose all levels that have infection present):  No infection present  Other Findings: see operative report    Electronically signed by Adis Olmedo DO on 10/4/2024 at 9:41 AM

## 2024-10-04 NOTE — H&P
10/4/2024 9:34 AM  Service: Urology  Group: [facility] (Hon[place]/Miguelina/Micheal)    Abigail DYSON Reyna  [id number]     Chief Complaint: OAB    History of Present Illness:  The patient is a 73 y.o. female patient who presents with the above   .The risk of the surgery was discussed at length.  The risk of the anesthesia and loss of airway.  Cardiovascular risks, myocardial infraction, cerebral vascular accident, pulmonary embolism, deep vein thrombosis.  Injury risk, bowel injury, bladder injury, ureteral injury, blood vessel injury, delayed presentation of the injury (ie scar tissue or stricture formation).  These can lead to bleeding, requiring transfusion.  He risk of infection with can lead to sepsis.  The risk of death.  The may be other issues that present that are not listed above which can occur.  The patient understood these risks, they understood the benefits, they understood the alternatives and fully consent to proceed.  The needs for a second procedure may also be required.      Past Medical History:   Diagnosis Date    Ambulates with cane     Arthritis     Bursitis     right hip    CAD (coronary artery disease)     follows with Dr Guajardo yearly (last ov 3/2024)    Chronic constipation     Diabetes mellitus (HCC)     STABLE PER PT    Frequent UTI     Hyperlipidemia     Hypertension     STABLE PER PT    Kidney stones     Lip lesion     uses dermatology creas for precancer of lip & section of face near eyes    AUSTIN treated with BiPAP     Prolonged emergence from general anesthesia     Restless leg syndrome     Urinary incontinence        Past Surgical History:   Procedure Laterality Date    CARDIAC CATHETERIZATION  12/16/2019    Dr. Ta- Resolute Andrea PERRY LAD 3.0 x 26    CARPAL TUNNEL RELEASE      bilateral    CATARACT REMOVAL WITH IMPLANT Left 02/07/2017    CATARACT REMOVAL WITH IMPLANT Right 04/06/2017    COLONOSCOPY      CYSTOSCOPY  03/02/2012    retrograde and cultures    CYSTOSCOPY  03/14/2012    cysto,

## 2024-10-04 NOTE — OP NOTE
[id number] [place] [address]                            OPERATIVE REPORT      PATIENT NAME: DANIEL SWANN                : 1950  MED REC NO: [id number]                        ROOM: [facility]  ACCOUNT NO: [id number]                       ADMIT DATE: 10/04/2024  PROVIDER: Adis Olmedo DO      DATE OF PROCEDURE:  10/04/2024    SURGEON:  Adis Olmedo DO    PREOPERATIVE DIAGNOSES:    1. Nonfunctioning InterStim.  2. Overactive bladder.   3. Urge related incontinence.    POSTOPERATIVE DIAGNOSES:    1. Nonfunctioning InterStim.  2. Overactive bladder.   3. Urge related incontinence.    PROCEDURES:    1. Explantation of the previous InterStim X with lead intact.  2. Implantation of percutaneous lead on the right S3 nerve root.  3. InterStim X implant with a Tyrx mesh.    ANESTHESIA:  Monitored anesthesia as well as local infiltration.    ESTIMATED BLOOD LOSS:  None.    CONDITION:  PACU, stable.    PREOPERATIVE ANTIBIOTICS:  Administered.    PATHOLOGIC SPECIMEN:  Old InterStim unit.    HISTORY:  This is a pleasant 73-year-old  female who presented to [facility] on the aforementioned date with the above diagnoses.  In the outpatient setting, the patient was consented for the above proposed surgical procedure.  The patient understood the risks, the benefits, the alternatives of the proposed surgical procedure, consented for the procedure on the aforementioned date.  Please note, the patient has had this implant in since initially , had a lead revision in 2018, and had a new InterStim placed in 2019, its battery has been depleted.  She has had recurrence of her overactive bladder symptomatology.  She is on oxybutynin with 60% improvement.  She has had Botox and conservative measures in the past.  She really would like to have a new implant placed in which is MRI safe.  The risks, benefits, and

## 2024-10-04 NOTE — ANESTHESIA POSTPROCEDURE EVALUATION
Department of Anesthesiology  Postprocedure Note    Patient: Abigail Orellana  MRN: [id number]  YOB: 1950  Date of evaluation: 10/4/2024    Procedure Summary       Date: 10/04/24 Room / Location: [address]    Anesthesia Start: 1035 Anesthesia Stop:     Procedure: EXPLANT OF NEUROSTIM ELECTRODE WHOLE IMPLANT INTERSTIM (Back) Diagnosis:       Mixed incontinence urge and stress (male)(female)      (Mixed incontinence urge and stress (male)(female) [N39.46])    Surgeons: Adis Olmedo DO Responsible Provider: Michelle Balderas DO    Anesthesia Type: MAC ASA Status: 3            Anesthesia Type: No value filed.    Bryce Phase I: Bryce Score: 10    Bryce Phase II:      Anesthesia Post Evaluation    Patient location during evaluation: PACU  Patient participation: complete - patient participated  Level of consciousness: awake  Airway patency: patent  Nausea & Vomiting: no nausea and no vomiting  Cardiovascular status: hemodynamically stable  Respiratory status: acceptable  Hydration status: euvolemic  Pain management: adequate        No notable events documented.

## 2024-10-04 NOTE — ANESTHESIA PRE PROCEDURE
moderate, past MI: > 6 months, CAD: no interval change, CABG/stent:, AVALOS: after ambulating 1 flight of stairs, hyperlipidemia      NYHA Classification: II  ECG reviewed  Rhythm: regular  Rate: normal  Echocardiogram reviewed  Stress test reviewed  Cleared by cardiology     Beta Blocker:  Not on Beta Blocker      ROS comment: Stent to LAD 12/2019     Neuro/Psych:   (+) neuromuscular disease:depression/anxiety              ROS comment: Restless leg syndrome GI/Hepatic/Renal:   (+) renal disease: CRI, morbid obesity          Endo/Other:    (+) DiabetesType II DM, well controlled, using insulin, hypothyroidism, blood dyscrasia: anticoagulation therapy, arthritis:..                  ROS comment: Discontinued Brilinta one week ago Abdominal:   (+) obese    Abdomen: soft.      Vascular: negative vascular ROS.         Other Findings:             Anesthesia Plan      MAC     ASA 3       Induction: intravenous.    MIPS: Postoperative opioids intended and Prophylactic antiemetics administered.  Anesthetic plan and risks discussed with patient and child/children.      Plan discussed with CRNA.                    STEPHANY CASANOVA DO  Staff Anesthesiologist  9:47 AM

## 2024-10-04 NOTE — DISCHARGE INSTRUCTIONS
Follow up Dr. Adis Olmedo in 1-4 week, [phone]    When you are discharged, it is okay to climb stairs, take a shower, and no heavy lifting greater than 5-10 pounds.  You will need to call the office to make an appointment to see Dr. Olmedo/Miguelina/Micheal in 10-14 days.  Pain medications will be written as well as antibiotics.  Hold on Driving until cleared by the physician.  Any questions or concerns arise call the office, [phone].

## 2024-10-08 LAB — SURGICAL PATHOLOGY REPORT: NORMAL

## 2025-02-10 ENCOUNTER — TRANSCRIBE ORDERS (OUTPATIENT)
Dept: ADMINISTRATIVE | Age: 75
End: 2025-02-10

## 2025-02-10 ENCOUNTER — HOSPITAL ENCOUNTER (OUTPATIENT)
Dept: ULTRASOUND IMAGING | Age: 75
Discharge: HOME | End: 2025-02-12
Payer: MEDICARE

## 2025-02-10 DIAGNOSIS — R10.9 ABDOMINAL PAIN, UNSPECIFIED ABDOMINAL LOCATION: ICD-10-CM

## 2025-02-10 DIAGNOSIS — R10.84 GENERALIZED ABDOMINAL PAIN: Primary | ICD-10-CM

## 2025-02-10 PROCEDURE — 76705 ECHO EXAM OF ABDOMEN: CPT

## 2025-02-24 ENCOUNTER — HOSPITAL ENCOUNTER (OUTPATIENT)
Dept: NUCLEAR MEDICINE | Age: 75
Discharge: HOME | End: 2025-02-24
Attending: FAMILY MEDICINE
Payer: MEDICARE

## 2025-02-24 VITALS — BODY MASS INDEX: 40.35 KG/M2 | WEIGHT: 250 LBS

## 2025-02-24 DIAGNOSIS — R10.84 GENERALIZED ABDOMINAL PAIN: ICD-10-CM

## 2025-02-24 PROCEDURE — A9537 TC99M MEBROFENIN: HCPCS | Performed by: RADIOLOGY

## 2025-02-24 PROCEDURE — 78227 HEPATOBIL SYST IMAGE W/DRUG: CPT

## 2025-02-24 PROCEDURE — 3430000000 HC RX DIAGNOSTIC RADIOPHARMACEUTICAL: Performed by: RADIOLOGY

## 2025-02-24 RX ADMIN — Medication 6 MILLICURIE: at 11:17

## 2025-02-28 ENCOUNTER — HOSPITAL ENCOUNTER (OUTPATIENT)
Dept: ULTRASOUND IMAGING | Age: 75
Discharge: HOME | End: 2025-02-28
Payer: MEDICARE

## 2025-02-28 DIAGNOSIS — N28.1 ACQUIRED CYST OF KIDNEY: ICD-10-CM

## 2025-02-28 PROCEDURE — 76770 US EXAM ABDO BACK WALL COMP: CPT

## 2025-03-16 ENCOUNTER — TRANSCRIBE ORDERS (OUTPATIENT)
Dept: ADMINISTRATIVE | Age: 75
End: 2025-03-16

## 2025-03-16 DIAGNOSIS — M70.61 TROCHANTERIC BURSITIS, RIGHT HIP: Primary | ICD-10-CM

## 2025-03-17 ENCOUNTER — OFFICE VISIT (OUTPATIENT)
Dept: CARDIOLOGY CLINIC | Age: 75
End: 2025-03-17
Payer: MEDICARE

## 2025-03-17 VITALS
RESPIRATION RATE: 18 BRPM | HEART RATE: 97 BPM | DIASTOLIC BLOOD PRESSURE: 82 MMHG | HEIGHT: 66 IN | TEMPERATURE: 98.6 F | SYSTOLIC BLOOD PRESSURE: 130 MMHG | WEIGHT: 242.8 LBS | OXYGEN SATURATION: 98 % | BODY MASS INDEX: 39.02 KG/M2

## 2025-03-17 DIAGNOSIS — Z95.5 HISTORY OF CORONARY ARTERY STENT PLACEMENT: ICD-10-CM

## 2025-03-17 DIAGNOSIS — E78.5 HYPERLIPIDEMIA LDL GOAL <100: ICD-10-CM

## 2025-03-17 DIAGNOSIS — I25.10 CORONARY ARTERY DISEASE INVOLVING NATIVE CORONARY ARTERY OF NATIVE HEART WITHOUT ANGINA PECTORIS: Primary | ICD-10-CM

## 2025-03-17 DIAGNOSIS — G47.33 OSA (OBSTRUCTIVE SLEEP APNEA): ICD-10-CM

## 2025-03-17 DIAGNOSIS — I10 HTN (HYPERTENSION), BENIGN: ICD-10-CM

## 2025-03-17 PROCEDURE — 3079F DIAST BP 80-89 MM HG: CPT | Performed by: INTERNAL MEDICINE

## 2025-03-17 PROCEDURE — 3075F SYST BP GE 130 - 139MM HG: CPT | Performed by: INTERNAL MEDICINE

## 2025-03-17 PROCEDURE — 93000 ELECTROCARDIOGRAM COMPLETE: CPT | Performed by: INTERNAL MEDICINE

## 2025-03-17 PROCEDURE — 99214 OFFICE O/P EST MOD 30 MIN: CPT | Performed by: INTERNAL MEDICINE

## 2025-03-17 PROCEDURE — 1159F MED LIST DOCD IN RCRD: CPT | Performed by: INTERNAL MEDICINE

## 2025-03-17 PROCEDURE — 1123F ACP DISCUSS/DSCN MKR DOCD: CPT | Performed by: INTERNAL MEDICINE

## 2025-03-17 PROCEDURE — G2211 COMPLEX E/M VISIT ADD ON: HCPCS | Performed by: INTERNAL MEDICINE

## 2025-03-17 RX ORDER — VIBEGRON 75 MG/1
TABLET, FILM COATED ORAL
COMMUNITY
Start: 2025-03-03

## 2025-03-17 NOTE — PROGRESS NOTES
modifications  - Treatment of AUSTIN  - Monitor labs  - Most recent urology office note reviewed  - Follow-up results of MRI hip (pending)    Greater than 30 minutes was spent counseling the patient, reviewing the rationale for the above recommendations and reviewing the patient's current medication list, problem list and results of all previously ordered testing.    Jona Guajardo MD  [facility] Cardiology

## 2025-04-06 ENCOUNTER — HOSPITAL ENCOUNTER (OUTPATIENT)
Dept: MRI IMAGING | Age: 75
Discharge: HOME | End: 2025-04-08
Attending: ORTHOPAEDIC SURGERY
Payer: MEDICARE

## 2025-04-06 DIAGNOSIS — M70.61 TROCHANTERIC BURSITIS, RIGHT HIP: ICD-10-CM

## 2025-04-06 PROCEDURE — 73721 MRI JNT OF LWR EXTRE W/O DYE: CPT

## 2025-06-05 ENCOUNTER — TRANSCRIBE ORDERS (OUTPATIENT)
Dept: ADMINISTRATIVE | Age: 75
End: 2025-06-05

## 2025-06-05 DIAGNOSIS — M54.16 LUMBAR RADICULOPATHY: Primary | ICD-10-CM

## 2025-06-28 ENCOUNTER — HOSPITAL ENCOUNTER (OUTPATIENT)
Dept: MRI IMAGING | Age: 75
Discharge: HOME | End: 2025-06-30
Attending: ORTHOPAEDIC SURGERY
Payer: MEDICARE

## 2025-06-28 DIAGNOSIS — M54.16 LUMBAR RADICULOPATHY: ICD-10-CM

## 2025-06-28 PROCEDURE — 72148 MRI LUMBAR SPINE W/O DYE: CPT

## (undated) DEVICE — MASTISOL ADHESIVE LIQ 2/3ML

## (undated) DEVICE — SHEET,DRAPE,53X77,STERILE: Brand: MEDLINE

## (undated) DEVICE — PACK PROCEDURE SURG GEN CUST

## (undated) DEVICE — NEEDLE HYPO 22GA L1.5IN BLK POLYPR HUB S STL REG BVL STR

## (undated) DEVICE — ADHESIVE SKIN CLSR 0.7ML TOP DERMBND ADV

## (undated) DEVICE — CLAMP TONSIL

## (undated) DEVICE — GLOVE SURG SZ 75 CRM LTX FREE POLYISOPRENE POLYMER BEAD ANTI

## (undated) DEVICE — LIQUIBAND RAPID ADHESIVE 36/CS 0.8ML: Brand: MEDLINE

## (undated) DEVICE — SOLUTION SURG PREP 26 CC PURPREP

## (undated) DEVICE — BLADE,STAINLESS-STEEL,11,STRL,DISPOSABLE: Brand: MEDLINE

## (undated) DEVICE — DRAPE,LAPAROTOMY,PCH,STERILE: Brand: MEDLINE

## (undated) DEVICE — NEEDLE HYPO 23GA L1.5IN TURQ POLYPR HUB S STL THN WALL IM

## (undated) DEVICE — PROGRAMMER SMART COMM W/HANDSET F/SACRAL NRVE STIMULATORS

## (undated) DEVICE — ABSORBENT, WATERPROOF, BACTERIA PROOF FILM DRESSING: Brand: OPSITE POST OP 15.5X8.5CM CTN 20

## (undated) DEVICE — TOWEL,OR,DSP,ST,BLUE,STD,6/PK,12PK/CS: Brand: MEDLINE

## (undated) DEVICE — RAZOR SURG PREP PERSONNA NONSTER FIXED

## (undated) DEVICE — SET INSTR BABY LAP

## (undated) DEVICE — ELECTRODE PT RET AD L9FT HI MOIST COND ADH HYDRGEL CORDED

## (undated) DEVICE — TAPE,WATERPROOF,CURAD,2"X10YD,LF,72/CS: Brand: CURAD

## (undated) DEVICE — 4-PORT MANIFOLD: Brand: NEPTUNE 2

## (undated) DEVICE — APPLICATOR PREP 26ML 0.7% IOD POVACRYLEX 74% ISO ALC ST

## (undated) DEVICE — DRESSING BORDERED ADH GZ UNIV GEN USE 8INX4IN AND 6INX2IN

## (undated) DEVICE — STRIP,CLOSURE,WOUND,MEDI-STRIP,1/2X4: Brand: MEDLINE

## (undated) DEVICE — DRAPE C ARM W41XL74IN UNIV MOB W RUBBERBAND CLP

## (undated) DEVICE — Device

## (undated) DEVICE — DOUBLE BASIN SET: Brand: MEDLINE INDUSTRIES, INC.

## (undated) DEVICE — TUBING SUCT 12FR MAL ALUM SHFT FN CAP VENT UNIV CONN W/ OBT

## (undated) DEVICE — CONTROL SYRINGE LUER-LOCK TIP: Brand: MONOJECT

## (undated) DEVICE — NEUROSTIMULATOR EXT SM LTWT SGL BTTN H2O RESIST WIRELESS

## (undated) DEVICE — NEEDLE NERVE STIM FORAMEN 5 IN INTERSTIM

## (undated) DEVICE — INTENDED FOR TISSUE SEPARATION, AND OTHER PROCEDURES THAT REQUIRE A SHARP SURGICAL BLADE TO PUNCTURE OR CUT.: Brand: BARD-PARKER ® STAINLESS STEEL BLADES

## (undated) DEVICE — CLAMP GALLBLADDER

## (undated) DEVICE — DRESSING COMP W4XL4IN N ADH PD W2.5XL2.5IN GZ BORDERED ADH

## (undated) DEVICE — SHEET, T, LAPAROTOMY, STERILE: Brand: MEDLINE

## (undated) DEVICE — SWABSTICK SURG PREP BENZOIN TINCTURE SINGLE ST

## (undated) DEVICE — SYRINGE MED 10ML TRNSLUC BRL PLUNG BLK MRK POLYPR CTRL

## (undated) DEVICE — SOLUTION IV IRRIG WATER 1000ML POUR BRL 2F7114